# Patient Record
Sex: MALE | Race: WHITE
[De-identification: names, ages, dates, MRNs, and addresses within clinical notes are randomized per-mention and may not be internally consistent; named-entity substitution may affect disease eponyms.]

---

## 2018-01-08 NOTE — HP
CIWA Score





- CIWA Score


Nausea/Vomiting: 3


Muscle Tremors: 3


Anxiety: 3


Agitation: 3


Paroxysmal Sweats: 2


Orientation: 0-Oriented


Tacttile Disturbances: 2-Mild Itch/Numbness/Burn


Auditory Disturbances: 2-Mild Harshness/Frighten


Visual Disturbances: 0-None


Headache: 2-Mild


CIWA-Ar Total Score: 20





Admission ROS [facility]





- HPI


Chief Complaint: 





i need help  to stop using klonopin


Allergies/Adverse Reactions: 


 Allergies











Allergy/AdvReac Type Severity Reaction Status Date / Time


 


No Known Allergies Allergy   Verified 01/08/18 14:46











History of Present Illness: 





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

,last treatment 12/17 in [place]


history of htn,hypercholestolemia,hepatitis c treated


anxiety,depression


longest period of sobriety 9 months


Exam Limitations: No Limitations





- Ebola screening


Have you traveled outside of the country in the last 21 days: No (N)


Have you had contact with anyone from an Ebola affected area: No


Have you been sick,other than usual withdrawal symptoms: No


Do you have a fever: No





- Review of Systems


Constitutional: Loss of Appetite, Malaise, Night Sweats, Changes in sleep, 

Weakness


EENT: reports: No Symptoms Reported


Respiratory: reports: No Symptoms reported


Cardiac: reports: No Symptoms Reported


GI: reports: Nausea, Poor Appetite, Abdominal cramping


: reports: No Symptoms Reported


Musculoskeletal: reports: Back Pain, Muscle Pain


Integumentary: reports: Dryness


Neuro: reports: Headache, Tremors


Endocrine: reports: No Symptoms Reported


Hematology: reports: No Symptoms Reported


Psychiatric: reports: Anxious, Depressed





Patient History





- Patient Medical History


Hx Anemia: No


Hx Asthma: No


Hx Chronic Obstructive Pulmonary Disease (COPD): No


Hx Cancer: No


Hx Cardiac Disorders: No


Hx Congestive Heart Failure: No


Hx Hypertension: Yes (on med)


Hx Hypercholesterolemia: Yes (on med)


Hx Pacemaker: No


HX Cerebrovascular Accident: No


Hx Seizures: No


Hx Dementia: No


Hx Diabetes: No


Hx Gastrointestinal Disorders: No


Hx Liver Disease: Yes (hepatitis c)


Hx Genitourinary Disorders: No


Hx Sexually Transmitted Disorders: No


Hx Renal Disease (ESRD): No


Hx Thyroid Disease: No


Hx Human Immunodeficiency Virus (HIV): No (never been tested,wouild like to 

have test done)


Hx Hepatitis C: Yes (treated)


Hx Depression: Yes (anxiety)


Hx Suicide Attempt: No


Hx Bipolar Disorder: No


Hx Schizophrenia: No


Other Medical History: no suicidal,no homicidal





- Patient Surgical History


Hx Cardiac Surgery: Yes (ablation for tachycardia at [facility])


Hx Cholecystectomy: Yes (lap 11/17 at [facility])


Other Surgical History: back surgery last 2014,implanted device for nerve 

stimulation for back pain





- PPD History


Previous Implant?: Yes


Documented Results: Negative w/o proof


Implanted On Prior [facility] Admission?: No


PPD to be Administered?: Yes





- Smoking Cessation


Smoking history: Never smoked


Have you smoked in the past 12 months: No





- Substance & Tx. History


Hx Alcohol Use: No


Hx Substance Use: Yes


Substance Use Type: Tranquilizers


Hx Substance Use Treatment: Yes (12/17 in [place])





- Substances Abused


  ** Benzodiazepine (Klonopin)


Route: Oral


Frequency: Daily


Amount used: 1.5mg


Age of first use: 63


Date of Last Use: 01/08/18





Family Disease History





- Family Disease History


Family History: Denies





Admission Physical Exam [facility]





- Vital Signs


Vital Signs: 


 Vital Signs - 24 hr











  01/08/18





  14:18


 


Temperature 97.9 F


 


Pulse Rate 81


 


Respiratory 18





Rate 


 


Blood Pressure 104/63














- Physical


General Appearance: Yes: Moderate Distress, Tremorous, Irritable, Sweating, 

Anxious


HEENTM: Yes: Normal ENT Inspection, SHANTELL, Pharynx Normal


Respiratory: Yes: Lungs Clear, Normal Breath Sounds, No Respiratory Distress


Neck: Yes: Within Normal Limits, Supple, Trachea in good position


Breast: Yes: Within Normal Limits


Cardiology: Yes: Within Normal Limits (a/p ablation for tachycardia), Regular 

Rhythm, Regular Rate


Abdominal: Yes: Normal Bowel Sounds, Non Tender, Flat, Soft, Organomegaly


Genitourinary: Yes: Within Normal Limits


Back: Yes: Normal Inspection, Muscle Spasm


Musculoskeletal: Yes: full range of Motion, Back pain, Muscle Pain


Extremities: Yes: Within Normal Limits, Tremors


Neurological: Yes: CNs II-XII NML intact, Fully Oriented, Alert, Motor Strength 

5/5


Integumentary: Yes: Dry


Lymphatic: Yes: Within Normal Limits





- Diagnostic


(1) Uncomplicated sedative, hypnotic or anxiolytic withdrawal


Current Visit: Yes   Status: Acute   





(2) Hypertension


Current Visit: Yes   Status: Acute   





(3) Hypercholesterolemia


Current Visit: Yes   Status: Acute   





(4) Anxiety and depression


Current Visit: Yes   Status: Acute   





(5) History of laparoscopic cholecystectomy


Current Visit: Yes   Status: Acute   





(6) History of cardiac radiofrequency ablation


Current Visit: Yes   Status: Acute   





(7) History of back surgery


Current Visit: Yes   Status: Acute   





Cleared for Admission [facility]





- Detox or Rehab


[facility] Level of Care: Medically Managed


Detox Regimen/Protocol: Librium (patient will be on librium regiment reduced 

dose)





[facility] Breath Alcohol Content


Breath Alcohol Content: 0





Urine Drug Screen





- Results


Drug Screen Negative: No


Urine Drug Screen Results: BZO-Benzodiazepines

## 2018-01-09 NOTE — CONSULT
[facility] Psychiatric Consult





- Data


Date of interview: 01/09/18


Admission source: [facility]


Identifying data: Pt. is a 72 year old male, , father of one, and 

retired from the [employer]. This is patient's first admission to [facility]. 

Pt. admitted to  for benzodiazepine dependence.


Substance Abuse History: - Substances Abused.  ** Benzodiazepine (Klonopin).  

Route: Oral.  Frequency: Daily.  Amount used: 1.5mg.  Age of first use: 63.  

Date of Last Use: 01/08/18


Medical History: hypertension, Hep C, hypercholesterolemia, ablation for 

tachycardia, back surgery in 2014, implanted device for nerve stimulation for 

back pain.  Cholecystectomy


Psychiatric History: Pt. reports one psychiatric hospitalization in the 2000's 

at the [facility] in [facility] after reporting suicidal ideation. States he is 

currently seeing an outpatient psychiatrist by the name of Dr. Santos at the 

[facility]. Pt. was drafted and served in the [employer] from [id number]. Pt. is currently 

prescribed zoloft 200mg po daily and buspar 10mg TID. States he has been on 

many psychiatric medications throughout the years but was medication 

noncompliant. Pt. now reports medication adherence with his zoloft and buspar 

for the previous six months.  Pt. reports a diagnosis of PTSD, anxiety, and 

panic attacks. Pt. denies suicidal and homicidal ideation.


Physical/Sexual Abuse/Trauma History: Denies.





Mental Status Exam





- Mental Status Exam


Alert and Oriented to: Time, Place, Person


Cognitive Function: Good


Patient Appearance: Well Groomed


Mood: Hopeful


Affect: Mood Congruent


Patient Behavior: Appropriate, Cooperative


Speech Pattern: Appropriate


Voice Loudness: Normal


Thought Process: Goal Oriented


Thought Disorder: Not Present


Hallucinations: Denies


Suicidal Ideation: Denies


Homicidal Ideation: Denies


Insight/Judgement: Poor


Sleep: Fair


Appetite: Fair


Muscle strength/Tone: Normal


Gait/Station: Normal





Psychiatric Findings





- Problem List (Axis 1, 2,3)


(1) Uncomplicated sedative, hypnotic or anxiolytic withdrawal


Current Visit: Yes   Status: Acute   





(2) PTSD (post-traumatic stress disorder)


Current Visit: Yes   Status: Chronic   Comment: Self reports.   





(3) Generalized anxiety disorder


Current Visit: Yes   Status: Chronic   Comment: Self reports.   





(4) Panic attacks


Current Visit: Yes   Status: Chronic   Comment: Self reports.   





- Initial Treatment Plan


Initial Treatment Plan: Psychoeducation provided. Detoxification in progress. 

Zoloft 150mg po daily (reduce dosage) +buspar 15mg BID ordered. Verbal consent 

given. Pt. agreeable with plan. Benefits and side effects discussed. Will 

continue to monitor patient.

## 2018-01-09 NOTE — EKG
Test Reason : 

Blood Pressure : ***/*** mmHG

Vent. Rate : 073 BPM     Atrial Rate : 073 BPM

   P-R Int : 162 ms          QRS Dur : 068 ms

    QT Int : 402 ms       P-R-T Axes : 034 -55 042 degrees

   QTc Int : 442 ms

 

SINUS RHYTHM WITH MARKED SINUS ARRHYTHMIA WITH OCCASIONAL PREMATURE

VENTRICULAR COMPLEXES

LEFT AXIS DEVIATION

INFERIOR INFARCT (CITED ON OR BEFORE 08-JAN-2018)

ANTEROLATERAL INFARCT , AGE UNDETERMINED

ABNORMAL ECG

Confirmed by Dileep Lema MD ([id number]) on 1/9/2018 2:44:30 PM

 

Referred By:             Confirmed By:Dileep Lema MD

## 2018-01-09 NOTE — EKG
Test Reason : 

Blood Pressure : ***/*** mmHG

Vent. Rate : 068 BPM     Atrial Rate : 068 BPM

   P-R Int : 144 ms          QRS Dur : 074 ms

    QT Int : 416 ms       P-R-T Axes : 030 -52 033 degrees

   QTc Int : 442 ms

 

SINUS RHYTHM WITH MARKED SINUS ARRHYTHMIA

LEFT AXIS DEVIATION

INFERIOR INFARCT , AGE UNDETERMINED

ABNORMAL ECG

NO PREVIOUS ECGS AVAILABLE

Confirmed by Dileep Lema MD ([id number]) on 1/9/2018 3:19:03 PM

 

Referred By:             Confirmed By:Dileep Lema MD

## 2018-01-09 NOTE — PN
S CIWA





- CIWA Score


Nausea/Vomiting: 3


Muscle Tremors: 3


Anxiety: 3


Agitation: 2


Paroxysmal Sweats: 1-Minimal Palms Moist


Orientation: 0-Oriented


Tacttile Disturbances: 1-Very Mild Itch/Numbness


Auditory Disturbances: 1-Very Mild


Visual Disturbances: 0-None


Headache: 2-Mild


CIWA-Ar Total Score: 16





[facility] Progress Note (SOAP)


Subjective: 





ALERT,IRRITABLE,ANXIOUS,INTERRUPTED SLEEP,TREMOR


Objective: 





01/09/18 10:22


 Vital Signs











Temperature  98.4 F   01/09/18 10:00


 


Pulse Rate  83   01/09/18 10:00


 


Respiratory Rate  20   01/09/18 10:00


 


Blood Pressure  126/76   01/09/18 10:00


 


O2 Sat by Pulse Oximetry (%)      








NSR WITH SINUS ARRHYTHMIA,OCCASIONAL VPC


NO CHEST PAIN,NO SOB,NO DIZZINESS


01/09/18 10:23


 Laboratory Last Values











Sodium  143 mmol/L (136-145)   01/09/18  08:00    


 


Potassium  3.8 mmol/L (3.5-5.1)   01/09/18  08:00    


 


Chloride  106 mmol/L ()   01/09/18  08:00    


 


Urine Color  Yellow   01/08/18  06:30    


 


Urine Appearance  Clear   01/08/18  06:30    


 


Urine pH  6.0  (5.0-8.0)   01/08/18  06:30    


 


Ur Specific Gravity  1.009  (1.001-1.035)   01/08/18  06:30    


 


Urine Protein  Negative  (NEGATIVE)   01/08/18  06:30    


 


Urine Glucose (UA)  Negative  (NEGATIVE)   01/08/18  06:30    


 


Urine Ketones  Negative  (NEGATIVE)   01/08/18  06:30    


 


Urine Blood  Negative  (NEGATIVE)   01/08/18  06:30    


 


Urine Nitrite  Negative  (NEGATIVE)   01/08/18  06:30    


 


Urine Bilirubin  Negative  (NEGATIVE)   01/08/18  06:30    


 


Urine Urobilinogen  Negative mg/dL (0.2-1.0)   01/08/18  06:30    


 


Ur Leukocyte Esterase  Negative  (NEGATIVE)   01/08/18  06:30    








LABS PENDING


Assessment: 





01/09/18 10:24


WITHDRAWAL SYMPTOM


Plan: 





CONTINUE DETOX

## 2018-01-10 NOTE — PN
S CIWA





- CIWA Score


Nausea/Vomiting: 3


Muscle Tremors: 3


Anxiety: 3


Agitation: 3


Paroxysmal Sweats: 1-Minimal Palms Moist


Orientation: 0-Oriented


Tacttile Disturbances: 1-Very Mild Itch/Numbness


Auditory Disturbances: 1-Very Mild


Visual Disturbances: 0-None


Headache: 2-Mild


CIWA-Ar Total Score: 17





[facility] Progress Note (SOAP)


Subjective: 





ALERT,IRRITABLE,ANXIOUS,INTERRUPTED SLEEP,TREMOR


Objective: 





01/10/18 11:00


 Vital Signs











Temperature  98.3 F   01/10/18 07:28


 


Pulse Rate  74   01/10/18 07:28


 


Respiratory Rate  18   01/10/18 07:28


 


Blood Pressure  145/82   01/10/18 07:28


 


O2 Sat by Pulse Oximetry (%)      











Assessment: 





01/10/18 11:00


 Laboratory Last Values











WBC  7.9 K/mm3 (4.0-10.0)   01/09/18  08:00    


 


RBC  4.19 M/mm3 (4.00-5.60)   01/09/18  08:00    


 


Hgb  11.4 GM/dL (11.7-16.9)  L  01/09/18  08:00    


 


Hct  34.4 % (35.4-49)  L  01/09/18  08:00    


 


MCV  82.2 fl (80-96)   01/09/18  08:00    


 


MCH  27.2 pg (25.7-33.7)   01/09/18  08:00    


 


MCHC  33.1 g/dl (32.0-35.9)   01/09/18  08:00    


 


RDW  14.4 % (11.9-15.9)   01/09/18  08:00    


 


Plt Count  153 K/MM3 (134-434)   01/09/18  08:00    


 


MPV  8.9 fl (7.5-11.1)   01/09/18  08:00    


 


Sodium  143 mmol/L (136-145)   01/09/18  08:00    


 


Potassium  3.8 mmol/L (3.5-5.1)   01/09/18  08:00    


 


Chloride  106 mmol/L ()   01/09/18  08:00    


 


Carbon Dioxide  30 mmol/L (21-32)   01/09/18  08:00    


 


Anion Gap  7  (8-16)  L  01/09/18  08:00    


 


BUN  20 mg/dL (7-18)  H  01/09/18  08:00    


 


Creatinine  0.9 mg/dL (0.7-1.3)   01/09/18  08:00    


 


Creat Clearance w eGFR  > 60  (>60)   01/09/18  08:00    


 


POC Glucometer  114 UNITS ()   01/10/18  09:48    


 


Random Glucose  100 mg/dL ()   01/09/18  08:00    


 


Calcium  8.9 mg/dL (8.5-10.1)   01/09/18  08:00    


 


Total Bilirubin  0.4 mg/dL (0.2-1.0)   01/09/18  08:00    


 


AST  17 U/L (15-37)   01/09/18  08:00    


 


ALT  26 U/L (12-78)   01/09/18  08:00    


 


Alkaline Phosphatase  94 U/L ()   01/09/18  08:00    


 


Total Protein  7.0 g/dl (6.4-8.2)   01/09/18  08:00    


 


Albumin  3.7 g/dl (3.4-5.0)   01/09/18  08:00    


 


Urine Color  Yellow   01/08/18  06:30    


 


Urine Appearance  Clear   01/08/18  06:30    


 


Urine pH  6.0  (5.0-8.0)   01/08/18  06:30    


 


Ur Specific Gravity  1.009  (1.001-1.035)   01/08/18  06:30    


 


Urine Protein  Negative  (NEGATIVE)   01/08/18  06:30    


 


Urine Glucose (UA)  Negative  (NEGATIVE)   01/08/18  06:30    


 


Urine Ketones  Negative  (NEGATIVE)   01/08/18  06:30    


 


Urine Blood  Negative  (NEGATIVE)   01/08/18  06:30    


 


Urine Nitrite  Negative  (NEGATIVE)   01/08/18  06:30    


 


Urine Bilirubin  Negative  (NEGATIVE)   01/08/18  06:30    


 


Urine Urobilinogen  Negative mg/dL (0.2-1.0)   01/08/18  06:30    


 


Ur Leukocyte Esterase  Negative  (NEGATIVE)   01/08/18  06:30    


 


RPR Titer  Nonreactive  (NONREACTIVE)   01/09/18  08:00    


 


HIV 1&2 Antibody Screen  Negative   01/09/18  08:00    


 


HIV P24 Antigen  Negative   01/09/18  08:00    











01/10/18 11:01


WITHDRAWAL SYMPTOM


Plan: 





CONTINUE DETOX,BGM MONITORING

## 2018-01-11 NOTE — PN
[facility] Progress Note (SOAP)


Subjective: 





ALERT,IRRITABLE,ANXIOUS,INTERRUPTED SLEEP


Objective: 





01/11/18 10:54


 Vital Signs











Temperature  98.2 F   01/11/18 10:08


 


Pulse Rate  67   01/11/18 10:08


 


Respiratory Rate  18   01/11/18 10:08


 


Blood Pressure  159/90   01/11/18 10:08


 


O2 Sat by Pulse Oximetry (%)      











Assessment: 





01/11/18 10:54


WITHDRAWAL SYMPTOM


Plan: 





CONTINUE DETOX,BGM IS 98,DISCHARGE IN AM

## 2018-01-12 NOTE — DS
[facility] Detox Discharge Summary


Admission Date: 


01/08/18





Discharge Date: 01/12/18





- History


Present History: Sedative Dependence


Additional Comments: 





follow up with after care program as arrangement


Pertinent Past History: 





hypertension


hypercholesteolemia


history of lap cholecystectomy


history of cardiac ablation


history of back surgery





- Physical Exam Results


Vital Signs: 


 Vital Signs











Temperature  97.3 F L  01/12/18 06:21


 


Pulse Rate  77   01/12/18 06:21


 


Respiratory Rate  18   01/12/18 06:21


 


Blood Pressure  146/87   01/12/18 06:21


 


O2 Sat by Pulse Oximetry (%)      











Pertinent Admission Physical Exam Findings: 





withdrawal symptom and finding





- Treatment


Hospital Course: Detox Protocol Followed, Detoxed Safely, Responded well, 

Discharged Condition Good


Patient has Accepted a Rehab Referral to: declined





- Medication


Discharge Medications: 


Ambulatory Orders





Ascorbic Acid [Vitamin C -] 500 mg PO BID 01/08/18 


Buspirone HCl [Buspar -] 10 mg PO BID 01/08/18 


Cholecalciferol (Vitamin D3) [Vitamin D3 -] 1,000 unit PO DAILY 01/08/18 


Doxazosin Mesylate 8 mg PO DAILY 01/08/18 


Hydrochlorothiazide [Hctz -] 25 mg PO DAILY 01/08/18 


Omeprazole 20 mg PO DAILY 01/08/18 


Sertraline HCl [Zoloft -] 200 mg PO DAILY 01/08/18 


Simvastatin [Zocor -] 40 mg PO HS 01/08/18 











- Diagnosis


(1) Uncomplicated sedative, hypnotic or anxiolytic withdrawal


Current Visit: Yes   Status: Acute   





(2) Hypertension


Current Visit: Yes   Status: Acute   





(3) Hypercholesterolemia


Current Visit: Yes   Status: Acute   





(4) Anxiety and depression


Current Visit: Yes   Status: Acute   





(5) History of laparoscopic cholecystectomy


Current Visit: Yes   Status: Acute   





(6) History of cardiac radiofrequency ablation


Current Visit: Yes   Status: Acute   





(7) History of back surgery


Current Visit: Yes   Status: Acute   





- AMA


Did Patient Leave Against Medical Advice: No

## 2020-03-17 ENCOUNTER — HOSPITAL ENCOUNTER (INPATIENT)
Dept: HOSPITAL 74 - J2C | Age: 75
LOS: 107 days | Discharge: HOME | DRG: 454 | End: 2020-07-02
Payer: COMMERCIAL

## 2020-03-17 VITALS — BODY MASS INDEX: 29 KG/M2

## 2020-03-17 DIAGNOSIS — R06.89: ICD-10-CM

## 2020-03-17 DIAGNOSIS — I47.1: ICD-10-CM

## 2020-03-17 DIAGNOSIS — M41.86: Primary | ICD-10-CM

## 2020-03-17 DIAGNOSIS — E78.5: ICD-10-CM

## 2020-03-17 DIAGNOSIS — E78.00: ICD-10-CM

## 2020-03-17 DIAGNOSIS — E87.6: ICD-10-CM

## 2020-03-17 DIAGNOSIS — R50.9: ICD-10-CM

## 2020-03-17 DIAGNOSIS — I10: ICD-10-CM

## 2020-03-17 DIAGNOSIS — F43.10: ICD-10-CM

## 2020-03-17 DIAGNOSIS — D72.829: ICD-10-CM

## 2020-03-17 DIAGNOSIS — B19.20: ICD-10-CM

## 2020-03-17 DIAGNOSIS — M47.896: ICD-10-CM

## 2020-03-17 DIAGNOSIS — E66.9: ICD-10-CM

## 2020-03-17 DIAGNOSIS — R00.8: ICD-10-CM

## 2020-03-17 DIAGNOSIS — D64.9: ICD-10-CM

## 2020-03-17 DIAGNOSIS — F41.8: ICD-10-CM

## 2020-03-17 PROCEDURE — P9038 RBC IRRADIATED: HCPCS

## 2020-03-17 PROCEDURE — P9058 RBC, L/R, CMV-NEG, IRRAD: HCPCS

## 2020-03-17 PROCEDURE — U0003 INFECTIOUS AGENT DETECTION BY NUCLEIC ACID (DNA OR RNA); SEVERE ACUTE RESPIRATORY SYNDROME CORONAVIRUS 2 (SARS-COV-2) (CORONAVIRUS DISEASE [COVID-19]), AMPLIFIED PROBE TECHNIQUE, MAKING USE OF HIGH THROUGHPUT TECHNOLOGIES AS DESCRIBED BY CMS-2020-01-R: HCPCS

## 2020-06-25 LAB
ANISOCYTOSIS BLD QL: 0
BASOPHILS # BLD: 0.1 % (ref 0–2)
DEPRECATED RDW RBC AUTO: 14.3 % (ref 11.9–15.9)
EOSINOPHIL # BLD: 0 % (ref 0–4.5)
HCT VFR BLD CALC: 32.3 % (ref 35.4–49)
HGB BLD-MCNC: 10.9 GM/DL (ref 11.7–16.9)
LYMPHOCYTES # BLD: 3.6 % (ref 8–40)
MACROCYTES BLD QL: 0
MCH RBC QN AUTO: 29.7 PG (ref 25.7–33.7)
MCHC RBC AUTO-ENTMCNC: 33.6 G/DL (ref 32–35.9)
MCV RBC: 88.2 FL (ref 80–96)
MONOCYTES # BLD AUTO: 4 % (ref 3.8–10.2)
NEUTROPHILS # BLD: 92.3 % (ref 42.8–82.8)
OVALOCYTES BLD QL SMEAR: (no result)
PLATELET # BLD AUTO: 173 K/MM3 (ref 134–434)
PLATELET BLD QL SMEAR: NORMAL
PMV BLD: 9.2 FL (ref 7.5–11.1)
RBC # BLD AUTO: 3.66 M/MM3 (ref 4–5.6)
WBC # BLD AUTO: 15.5 K/MM3 (ref 4–10)

## 2020-06-25 PROCEDURE — 0SG30AJ FUSION OF LUMBOSACRAL JOINT WITH INTERBODY FUSION DEVICE, POSTERIOR APPROACH, ANTERIOR COLUMN, OPEN APPROACH: ICD-10-PCS | Performed by: NEUROLOGICAL SURGERY

## 2020-06-25 PROCEDURE — 0SG3071 FUSION OF LUMBOSACRAL JOINT WITH AUTOLOGOUS TISSUE SUBSTITUTE, POSTERIOR APPROACH, POSTERIOR COLUMN, OPEN APPROACH: ICD-10-PCS | Performed by: NEUROLOGICAL SURGERY

## 2020-06-25 PROCEDURE — 0RGA071 FUSION OF THORACOLUMBAR VERTEBRAL JOINT WITH AUTOLOGOUS TISSUE SUBSTITUTE, POSTERIOR APPROACH, POSTERIOR COLUMN, OPEN APPROACH: ICD-10-PCS | Performed by: NEUROLOGICAL SURGERY

## 2020-06-25 PROCEDURE — 0JX70ZB TRANSFER BACK SUBCUTANEOUS TISSUE AND FASCIA WITH SKIN AND SUBCUTANEOUS TISSUE, OPEN APPROACH: ICD-10-PCS | Performed by: NEUROLOGICAL SURGERY

## 2020-06-25 PROCEDURE — 0ST40ZZ RESECTION OF LUMBOSACRAL DISC, OPEN APPROACH: ICD-10-PCS | Performed by: NEUROLOGICAL SURGERY

## 2020-06-25 PROCEDURE — 0JPT0MZ REMOVAL OF STIMULATOR GENERATOR FROM TRUNK SUBCUTANEOUS TISSUE AND FASCIA, OPEN APPROACH: ICD-10-PCS | Performed by: NEUROLOGICAL SURGERY

## 2020-06-25 PROCEDURE — B01BZZZ FLUOROSCOPY OF SPINAL CORD: ICD-10-PCS | Performed by: NEUROLOGICAL SURGERY

## 2020-06-25 PROCEDURE — 00PU0MZ REMOVAL OF NEUROSTIMULATOR LEAD FROM SPINAL CANAL, OPEN APPROACH: ICD-10-PCS | Performed by: NEUROLOGICAL SURGERY

## 2020-06-25 PROCEDURE — 0SG1071 FUSION OF 2 OR MORE LUMBAR VERTEBRAL JOINTS WITH AUTOLOGOUS TISSUE SUBSTITUTE, POSTERIOR APPROACH, POSTERIOR COLUMN, OPEN APPROACH: ICD-10-PCS | Performed by: NEUROLOGICAL SURGERY

## 2020-06-25 PROCEDURE — 30233N1 TRANSFUSION OF NONAUTOLOGOUS RED BLOOD CELLS INTO PERIPHERAL VEIN, PERCUTANEOUS APPROACH: ICD-10-PCS | Performed by: INTERNAL MEDICINE

## 2020-06-25 RX ADMIN — ACETAMINOPHEN SCH: 325 TABLET ORAL at 23:04

## 2020-06-25 RX ADMIN — CEFAZOLIN SCH MLS: 1 INJECTION, POWDER, FOR SOLUTION INTRAVENOUS at 21:30

## 2020-06-25 RX ADMIN — MUPIROCIN SCH APPLIC: 20 OINTMENT TOPICAL at 23:04

## 2020-06-25 RX ADMIN — SODIUM CHLORIDE, POTASSIUM CHLORIDE, SODIUM LACTATE AND CALCIUM CHLORIDE SCH MLS: 600; 310; 30; 20 INJECTION, SOLUTION INTRAVENOUS at 22:09

## 2020-06-25 NOTE — OP
Operative Note





- Note:


Operative Date: 06/25/20


Pre-Operative Diagnosis: lumbar spondylosis


Operation: T12-S2 Laminectomies with L34, L45 and L5S1 transpedicular 

decompression and osteotomies and deformity correction T12-S2 pedicle screw 

fusion with athrodesis and interbody cage at L5-S1


Post-Operative Diagnosis: Same as Pre-op


Surgeon: Thad Gray


Assistant: Lorena Lopez


Anesthesiologist/CRNA: Shayne Haider


Anesthesia: General, Spinal, Local


Estimated Blood Loss (mls): 1,000


Drains & Tubes with Location: right Lumbar spine KIAH drain placed


Drains, Volume Out (mls): 300 (padron)


Blood Volume Replaced (mls): 250 (1 Unit)


Fluid Volume Replaced (mls): 3,000


Operative Report Dictated: Yes

## 2020-06-25 NOTE — CONSULT
Consultation: 


REQUESTING PROVIDER:


Orthopedic surgery


CONSULT REQUEST: We have been asked to medically evaluate this patient for 

(lumbar spondylosis s/p T12-S2 Laminectomies, L34, L45 and L5S1 transpedicular 

decompression, osteotomies, deformity correction T12-S2 pedicle screw fusion 

with athrodesis/interbody cage at L5-S1).





HISTORY OF PRESENT ILLNESS:





74 M, pmh of htn, hld, hx pacs and pvcs, svt s/p ablation 2017, presents to [facility]

for elective spine surgery w/ Thad Erickson. Unable to verify further hx 

at this time as pt is sedated and vented from surgery. Upon chart review, pt was

cleared by cardiology, w/ no cp, sob, palps, dizzy, loc, pnd, orthopnea le 

edema. Pt regularly f/u w/ cardio, last visit about 6 mos ago, reported normal 

findings. As per pmd notes, pt has known hx of pacs/pvcs with nl lvef. Pt 

received versed while starting anesthesia, noticed pacs in [facility] on monitor 

thus surgery postponed. Pt is now s/p surgery and remains vented and sedated in 

ICU. 





REVIEW OF SYSTEMS:


unable to obtain as pt is vented and sedated.





PHYSICAL EXAMINATION





                               Vital Signs - 24 hr











  06/25/20 06/25/20 06/25/20





  06:43 08:46 09:00


 


Temperature  97.7 F 


 


Pulse Rate  70 72


 


Respiratory  18 18





Rate   


 


Blood Pressure  129/61 129/66


 


O2 Sat by Pulse 100 99 97





Oximetry (%)   



































GENERAL: sedated and vented


EYES: Pupils equal, round and reactive to light, extraocular movements intact, 

sclera anicteric, waking up 


EARS, NOSE, THROAT: Moist mucous membranes.


NECK: Normal range of motion, supple without lymphadenopathy, JVD, or masses.


LUNGS: breath sounds decreased on the left side compared to right. 


HEART: Regular rate and irregular rhythm, normal S1 and S2 without murmur, rub 

or gallop.


ABDOMEN: Soft, nontender, not distended, normoactive bowel sounds, no guarding, 

no rebound, no masses.  


UPPER EXTREMITIES: 2+ pulses, warm, well-perfused. No cyanosis. No peripheral 

edema.


LOWER EXTREMITIES: 2+ pulses, warm, well-perfused. No peripheral edema. 


NEUROLOGICAL: sedated, vented, waking up slowly 


SKIN: Warm, dry, normal turgor, no rashes or lesions noted. 











                         Laboratory Results - last 24 hr











  06/25/20 06/25/20





  06:10 20:30


 


WBC   15.5 H


 


RBC   3.66 L


 


Hgb   10.9 L


 


Hct   32.3 L


 


MCV   88.2


 


MCH   29.7


 


MCHC   33.6


 


RDW   14.3


 


Plt Count   173


 


MPV   9.2


 


Absolute Neuts (auto)   14.3 H


 


Neutrophils %   92.3 H D


 


Neutrophils % (Manual)   85.1 H


 


Band Neutrophils %   7.9


 


Lymphocytes %   3.6 L D


 


Lymphocytes % (Manual)   2.0 L


 


Monocytes %   4.0


 


Monocytes % (Manual)   4


 


Eosinophils %   0.0  D


 


Eosinophils % (Manual)   0.0


 


Basophils %   0.1


 


Basophils % (Manual)   1.0


 


Myelocytes % (Man)   0


 


Promyelocytes % (Man)   0


 


Blast Cells % (Manual)   0


 


Nucleated RBC %   0


 


Metamyelocytes   0


 


Hypochromia   1+


 


Platelet Estimate   Normal


 


Polychromasia   1+


 


Poikilocytosis   1+


 


Anisocytosis   0


 


Microcytosis   0


 


Macrocytosis   0


 


Ovalocytes   1+


 


Blood Type  B POSITIVE 


 


Antibody Screen  Negative 


 


Crossmatch  See Detail 








Active Medications











Generic Name Dose Route Start Last Admin





  Trade Name Freq  PRN Reason Stop Dose Admin


 


Acetaminophen  650 mg  06/25/20 16:15 





  Tylenol -  PO  





  Q6H [facility]  


 


Ascorbic Acid  500 mg  06/25/20 22:00 





  Vitamin C -  PO  





  BID [facility]  


 


Atorvastatin Calcium  40 mg  06/25/20 22:00 





  Lipitor -  PO  





  HS [facility]  


 


Chlorhexidine Gluconate  1 applic  06/25/20 22:00 





  Hibiclens For Decolonization -  TP  





  HS [facility]  


 


Clonazepam  1 mg  06/25/20 22:00 





  Klonopin -  PO  





  BID ANTELMO  


 


Dicyclomine HCl  10 mg  06/25/20 22:00 





  Bentyl -  PO  





  BID [facility]  


 


Diphenhydramine HCl  25 mg  06/25/20 16:15 





  Benadryl Injection -  IVPUSH  





  ONCE PRN  





  FOR ITCHING  


 


Diphenhydramine HCl  25 mg  06/25/20 16:24 





  Benadryl -  PO  





  Q6H PRN  





  FOR ITCHING  


 


Docusate Sodium  100 mg  06/25/20 22:00 





  Colace -  PO  





  TID ANTELMO  


 


Fentanyl  25 mcg  06/25/20 18:25  06/25/20 19:10





  Sublimaze Injection -  IVPUSH   25 mcg





  [id number] PRN   Administration





  PAIN-PACU ORDER X 4 DOSES ONLY  


 


Ferrous Sulfate  325 mg  06/26/20 08:00 





  Feosol -  PO  





  DAILY@0800 [facility]  


 


Folic Acid  1 mg  06/26/20 10:00 





  Folic Acid -  PO  





  DAILY [facility]  


 


Gabapentin  200 mg  06/25/20 22:00 





  Neurontin -  PO  





  BID [facility]  


 


Heparin Sodium (Porcine)  5,000 unit  06/26/20 10:00 





  Heparin -  SQ  





  Q8H [facility]  


 


Hydrochlorothiazide  25 mg  06/26/20 10:00 





  Hctz -  PO  





  DAILY [facility]  


 


Propofol  1,000,000 mcg in 100 mls @ 2.449 mls/hr  06/25/20 16:30  06/25/20 

16:30





  Diprivan -  IVPB   0 mls





  TITR ANTELMO   Administration





  Protocol  





  5 MCG/KG/MIN  


 


Lactated Ringer's  1,000 ml in 1,000 mls @ 75 mls/hr  06/25/20 16:30 





  Lactated Ringers Solution  IV  





  ASDIR [facility]  


 


Cefazolin Sodium 1 gm/  50 mls @ 100 mls/hr  06/25/20 22:00 





  Dextrose  IVPB  





  TID [facility]  


 


Multivitamins/Minerals/Vitamin C  1 tab  06/26/20 10:00 





  Tab-A-Vit -  PO  





  DAILY [facility]  


 


Mupirocin  1 applic  06/25/20 22:00 





  Bactroban Ointment (For Decolonization) -  NS  06/30/20 21:59 





  BID [facility]  


 


Naloxone HCl  0.4 mg  06/25/20 16:15 





  Narcan -  IVPUSH  





  ONCE PRN  





  Sedation  


 


Ondansetron HCl  4 mg  06/25/20 16:24 





  Zofran Injection  IVPUSH  





  Q6H PRN  





  NAUSEA  


 


Oxycodone HCl  10 mg  06/26/20 16:17 





  Oxycontin -  PO  





  BID [facility]  


 


Pantoprazole Sodium  20 mg  06/26/20 10:00 





  Protonix -  PO  





  DAILY [facility]  


 


Tamsulosin HCl  0.4 mg  06/26/20 08:30 





  Flomax -  PO  





  0830 [facility]  


 


Venlafaxine HCl  75 mg  06/25/20 22:00 





  Effexor Xr -  PO  





  BID [facility]  











ASSESSMENT/PLAN:





74 M, pmh of htn, hld, hx pacs and pvcs, svt s/p ablation 2017, presents to [facility]

for elective spine surgery w/ Thad Erickson for lumbar spondylosis s/p 

T12-S2 Laminectomies, decompression, osteotomies, deformity correction, screw 

fusion is admitted for ICU monitoring post op





#Neuro


remains vented and sedated


Propofol 30


Will start fentalyn, reassess in the morning to wake pt up 


oxycodone for pain management as per surgery


gabapentin


Narcan given once 


Tylenol Q6H





#Pulmonary


Earlier CXR showed ETT in the right mainstem bronchus, decreased breath sounds 

on left


R/p CXR shows ETT in remains in the right bronchus, will pull back 3-4 cm


AC 12/500/40/5, will wean tidal volume down 


R/p CXR to confirm placement





#CV


cont IVF LR at 75


EKG shows pacs in bigemeny, as per PMD, pt has normal pacs and LV function is 

wnl 


HCTZ 25


Lipitor 40





#GI


protonix 


zofran 


docusate





#ID


leukocytosis likely reactive to surgery 


Cefazolin 


can d/c after 24hrs if warranted 


#Derm


Benadryl for itching 





#


flomax





#Psych


unable to verify dx


effexor 


Klonopin 





#Heme


Normocytic anemia 


cont ferrous sulfate


cont folic acid 





#DVT


hep sq





#GI ppx


Protonix





FEN


cont LR at 75


monitor lytes


Clear liquid diet, NGT for meds for now 





Dispo: We will continue to follow the patient. Thank you for this consultative 

opportunity. Rest as per surgery 











Visit type





- Emergency Visit


Emergency Visit: Yes


ED Registration Date: 06/25/20


Care time: The patient presented to the Emergency Department on the above date 

and was hospitalized for further evaluation of their emergent condition.





- New Patient


This patient is new to me today: Yes


Date on this admission: 06/29/20





- Critical Care


Critical Care patient: No





ATTENDING PHYSICIAN STATEMENT





I saw and evaluated the patient.


I reviewed the resident's note and discussed the case with the resident.


I agree with the resident's findings and plan as documented.








SUBJECTIVE:








OBJECTIVE:








ASSESSMENT AND PLAN:

## 2020-06-25 NOTE — CON.CARD
Cardiology Consult (text)





- Consultation


Consultation Note: 





cc: elective spine surgery





hpi:  74 m hx htn, hld, hx pacs and pvcs, svt s/p ablation 2017, here for 

elective spine surgery.  No cp sob palps dizzy loc pnd orthopnea le edema.  Sees

cardio regularly, last visit about 6 mos ago, reported normal findings.  Today 

got versed while starting anesthesia and noticed pacs in bigeminy on monitor so 

surgery postponed.  Feeling well now.  PACs on ecg and tele.





pmh: per hpi


psh: ablation


social: ex tob


fam: no premature cad scd


ros: per hpi; back pain; all others wnl


meds:


                                   Vital Signs











 Period  Temp  Pulse  Resp  BP Sys/Beard  Pulse Ox


 


 Last 24 Hr  97.7 F  67-72  18-18  129-155/61-81  








nad no jvd


rrr s1s2 no mrg


cta bl nl eff


aao3


no le e/c/c


abd nt nd pos bs


no jaundice diaphoresis


pos dp pt no carotid bruits





                             Laboratory Last Values











Blood Type  B POSITIVE   06/25/20  06:10    


 


Antibody Screen  Negative   06/25/20  06:10    


 


Crossmatch  See Detail   06/25/20  06:10    








ecg: sr pacs bigeminy, nl intervals, no ischemic changes





tele: sr, occ pacs








a/p:  74 m hx htn, hld, hx pacs and pvcs, svt s/p ablation 2017, here for 

elective spine surgery.





abnl ecg:


-ecg/tele showing occasional pacs, at times in bigeminy pattern. pt is 

asymptomatic from these.  as per pmd notes pt has known hx of pacs/pvcs with nl 

lvef.  This is benign and no further cardiac tx/testing needed at this time.  No

cardiac contraindications to proceeding with planned spine surgery.





htn:


-cont home meds





hld:


-cont home med





svt s/p ablation:


-in sr here


-cont outpt cardio f/u

## 2020-06-25 NOTE — HP
History & Physical Update





- History


History: No Change





- Physical


Physical: No Change





- Assessment


Assessment: No Change





- Plan


Plan: No Change (no changes since visit on 6/23/20)

## 2020-06-26 LAB
ANION GAP SERPL CALC-SCNC: 4 MMOL/L (ref 8–16)
ANION GAP SERPL CALC-SCNC: 6 MMOL/L (ref 8–16)
BUN SERPL-MCNC: 20.4 MG/DL (ref 7–18)
BUN SERPL-MCNC: 21.8 MG/DL (ref 7–18)
CALCIUM SERPL-MCNC: 7.2 MG/DL (ref 8.5–10.1)
CALCIUM SERPL-MCNC: 7.5 MG/DL (ref 8.5–10.1)
CHLORIDE SERPL-SCNC: 109 MMOL/L (ref 98–107)
CHLORIDE SERPL-SCNC: 110 MMOL/L (ref 98–107)
CO2 SERPL-SCNC: 26 MMOL/L (ref 21–32)
CO2 SERPL-SCNC: 30 MMOL/L (ref 21–32)
CREAT SERPL-MCNC: 0.9 MG/DL (ref 0.55–1.3)
CREAT SERPL-MCNC: 0.9 MG/DL (ref 0.55–1.3)
DEPRECATED RDW RBC AUTO: 14.4 % (ref 11.9–15.9)
GLUCOSE SERPL-MCNC: 124 MG/DL (ref 74–106)
GLUCOSE SERPL-MCNC: 128 MG/DL (ref 74–106)
HCT VFR BLD CALC: 30.4 % (ref 35.4–49)
HGB BLD-MCNC: 10.1 GM/DL (ref 11.7–16.9)
MCH RBC QN AUTO: 29.5 PG (ref 25.7–33.7)
MCHC RBC AUTO-ENTMCNC: 33.3 G/DL (ref 32–35.9)
MCV RBC: 88.6 FL (ref 80–96)
PLATELET # BLD AUTO: 156 K/MM3 (ref 134–434)
PMV BLD: 9 FL (ref 7.5–11.1)
POTASSIUM SERPLBLD-SCNC: 4 MMOL/L (ref 3.5–5.1)
POTASSIUM SERPLBLD-SCNC: 4 MMOL/L (ref 3.5–5.1)
RBC # BLD AUTO: 3.43 M/MM3 (ref 4–5.6)
SODIUM SERPL-SCNC: 143 MMOL/L (ref 136–145)
SODIUM SERPL-SCNC: 143 MMOL/L (ref 136–145)
WBC # BLD AUTO: 13 K/MM3 (ref 4–10)

## 2020-06-26 RX ADMIN — FOLIC ACID SCH: 1 TABLET ORAL at 12:32

## 2020-06-26 RX ADMIN — OXYCODONE HYDROCHLORIDE AND ACETAMINOPHEN SCH: 500 TABLET ORAL at 12:31

## 2020-06-26 RX ADMIN — CHLORHEXIDINE GLUCONATE SCH APPLIC: 213 SOLUTION TOPICAL at 22:02

## 2020-06-26 RX ADMIN — ACETAMINOPHEN SCH: 325 TABLET ORAL at 23:00

## 2020-06-26 RX ADMIN — ACETAMINOPHEN SCH: 325 TABLET ORAL at 05:52

## 2020-06-26 RX ADMIN — HEPARIN SODIUM SCH UNIT: 5000 INJECTION, SOLUTION INTRAVENOUS; SUBCUTANEOUS at 12:00

## 2020-06-26 RX ADMIN — ATORVASTATIN CALCIUM SCH: 40 TABLET, FILM COATED ORAL at 03:28

## 2020-06-26 RX ADMIN — GABAPENTIN SCH: 100 CAPSULE ORAL at 03:28

## 2020-06-26 RX ADMIN — HYDROCHLOROTHIAZIDE SCH: 25 TABLET ORAL at 12:32

## 2020-06-26 RX ADMIN — ACETAMINOPHEN SCH: 325 TABLET ORAL at 05:03

## 2020-06-26 RX ADMIN — HEPARIN SODIUM SCH UNIT: 5000 INJECTION, SOLUTION INTRAVENOUS; SUBCUTANEOUS at 11:50

## 2020-06-26 RX ADMIN — DOCUSATE SODIUM SCH: 100 CAPSULE, LIQUID FILLED ORAL at 05:04

## 2020-06-26 RX ADMIN — VENLAFAXINE HYDROCHLORIDE SCH MG: 75 CAPSULE, EXTENDED RELEASE ORAL at 22:59

## 2020-06-26 RX ADMIN — DICYCLOMINE HYDROCHLORIDE SCH MG: 10 CAPSULE ORAL at 22:59

## 2020-06-26 RX ADMIN — CEFAZOLIN SCH MLS/HR: 1 INJECTION, POWDER, FOR SOLUTION INTRAVENOUS at 05:04

## 2020-06-26 RX ADMIN — GABAPENTIN SCH: 100 CAPSULE ORAL at 12:00

## 2020-06-26 RX ADMIN — VENLAFAXINE HYDROCHLORIDE SCH: 75 CAPSULE, EXTENDED RELEASE ORAL at 03:27

## 2020-06-26 RX ADMIN — ACETAMINOPHEN SCH MG: 325 TABLET ORAL at 15:48

## 2020-06-26 RX ADMIN — FERROUS SULFATE TAB EC 324 MG (65 MG FE EQUIVALENT) SCH: 324 (65 FE) TABLET DELAYED RESPONSE at 08:42

## 2020-06-26 RX ADMIN — DOCUSATE SODIUM SCH MG: 100 CAPSULE, LIQUID FILLED ORAL at 14:57

## 2020-06-26 RX ADMIN — TAMSULOSIN HYDROCHLORIDE SCH: 0.4 CAPSULE ORAL at 08:42

## 2020-06-26 RX ADMIN — OXYCODONE HYDROCHLORIDE SCH MG: 10 TABLET, FILM COATED, EXTENDED RELEASE ORAL at 15:47

## 2020-06-26 RX ADMIN — PANTOPRAZOLE SODIUM SCH: 20 TABLET, DELAYED RELEASE ORAL at 12:00

## 2020-06-26 RX ADMIN — MULTIVITAMIN TABLET SCH: TABLET at 12:31

## 2020-06-26 RX ADMIN — HEPARIN SODIUM SCH UNIT: 5000 INJECTION, SOLUTION INTRAVENOUS; SUBCUTANEOUS at 18:34

## 2020-06-26 RX ADMIN — DOCUSATE SODIUM SCH MG: 100 CAPSULE, LIQUID FILLED ORAL at 22:03

## 2020-06-26 RX ADMIN — OXYCODONE HYDROCHLORIDE AND ACETAMINOPHEN SCH: 500 TABLET ORAL at 03:28

## 2020-06-26 RX ADMIN — SODIUM CHLORIDE, POTASSIUM CHLORIDE, SODIUM LACTATE AND CALCIUM CHLORIDE SCH: 600; 310; 30; 20 INJECTION, SOLUTION INTRAVENOUS at 18:27

## 2020-06-26 RX ADMIN — VENLAFAXINE HYDROCHLORIDE SCH MG: 75 CAPSULE, EXTENDED RELEASE ORAL at 14:56

## 2020-06-26 RX ADMIN — ACETAMINOPHEN SCH: 325 TABLET ORAL at 12:31

## 2020-06-26 RX ADMIN — CEFAZOLIN SCH MLS/HR: 1 INJECTION, POWDER, FOR SOLUTION INTRAVENOUS at 22:02

## 2020-06-26 RX ADMIN — MUPIROCIN SCH APPLIC: 20 OINTMENT TOPICAL at 22:03

## 2020-06-26 RX ADMIN — DICYCLOMINE HYDROCHLORIDE SCH: 10 CAPSULE ORAL at 12:00

## 2020-06-26 RX ADMIN — MUPIROCIN SCH APPLIC: 20 OINTMENT TOPICAL at 10:50

## 2020-06-26 RX ADMIN — GABAPENTIN SCH MG: 100 CAPSULE ORAL at 22:04

## 2020-06-26 RX ADMIN — SODIUM CHLORIDE, POTASSIUM CHLORIDE, SODIUM LACTATE AND CALCIUM CHLORIDE SCH MLS/HR: 600; 310; 30; 20 INJECTION, SOLUTION INTRAVENOUS at 12:51

## 2020-06-26 RX ADMIN — CEFAZOLIN SCH MLS/HR: 1 INJECTION, POWDER, FOR SOLUTION INTRAVENOUS at 14:43

## 2020-06-26 RX ADMIN — ATORVASTATIN CALCIUM SCH MG: 40 TABLET, FILM COATED ORAL at 22:04

## 2020-06-26 RX ADMIN — OXYCODONE HYDROCHLORIDE SCH MG: 10 TABLET, FILM COATED, EXTENDED RELEASE ORAL at 22:04

## 2020-06-26 RX ADMIN — DICYCLOMINE HYDROCHLORIDE SCH: 10 CAPSULE ORAL at 03:27

## 2020-06-26 RX ADMIN — OXYCODONE HYDROCHLORIDE AND ACETAMINOPHEN SCH MG: 500 TABLET ORAL at 22:05

## 2020-06-26 RX ADMIN — DOCUSATE SODIUM SCH: 100 CAPSULE, LIQUID FILLED ORAL at 03:27

## 2020-06-26 NOTE — PN
Physical Exam: 


SUBJECTIVE: Patient seen and examined at bedside. He was admitted overnight. 

This AM he is intubated, sedated, and unable to participate in medical 

interview.





OBJECTIVE:





                                   Vital Signs








 Period  Temp  Pulse  Resp  BP Sys/Beard  Pulse Ox


 


 Last 24 Hr  97.2 F-98.6 F  66-94  8-28  /45-91  








GENERAL: The patient is intubated and sedated


HEAD: Normal with no signs of trauma.


EYES: SANDIE, No ptosis. 


ENT: Ears normal, nares patent, ETT in place


NECK: Trachea midline, full range of motion, supple. 


LUNGS: Breath sounds equal, clear to auscultation bilaterally, no wheezes, no 

crackles, no accessory muscle use. 


HEART: Regular rate and rhythm, S1, S2 without murmur, rub or gallop.


ABDOMEN: Soft, nontender, nondistended, normoactive bowel sounds, no guarding, 

no rebound, no hepatosplenomegaly, no masses.


EXTREMITIES: 2+ pulses, warm, well-perfused, no edema. 


NEUROLOGICAL: Cranial nerves II through XII grossly intact. Normal speech, gait 

not observed.


SKIN: Large tattoo on RIGHT arm. Warm, dry, normal turgor, no rashes or lesions 

noted





                         Laboratory Results - last 24 hr








  06/25/20 06/25/20 06/26/20





  06:10 20:30 00:30


 


WBC   15.5 H 


 


RBC   3.66 L 


 


Hgb   10.9 L 


 


Hct   32.3 L 


 


MCV   88.2 


 


MCH   29.7 


 


MCHC   33.6 


 


RDW   14.3 


 


Plt Count   173 


 


MPV   9.2 


 


Absolute Neuts (auto)   14.3 H 


 


Neutrophils %   92.3 H D 


 


Neutrophils % (Manual)   85.1 H 


 


Band Neutrophils %   7.9 


 


Lymphocytes %   3.6 L D 


 


Lymphocytes % (Manual)   2.0 L 


 


Monocytes %   4.0 


 


Monocytes % (Manual)   4 


 


Eosinophils %   0.0  D 


 


Eosinophils % (Manual)   0.0 


 


Basophils %   0.1 


 


Basophils % (Manual)   1.0 


 


Myelocytes % (Man)   0 


 


Promyelocytes % (Man)   0 


 


Blast Cells % (Manual)   0 


 


Nucleated RBC %   0 


 


Metamyelocytes   0 


 


Hypochromia   1+ 


 


Platelet Estimate   Normal 


 


Polychromasia   1+ 


 


Poikilocytosis   1+ 


 


Anisocytosis   0 


 


Microcytosis   0 


 


Macrocytosis   0 


 


Ovalocytes   1+ 


 


Sodium    143


 


Potassium    4.0


 


Chloride    110 H


 


Carbon Dioxide    26


 


Anion Gap    6 L


 


BUN    21.8 H


 


Creatinine    0.9


 


Est GFR (CKD-EPI)AfAm    98.55


 


Est GFR (CKD-EPI)NonAf    85.03


 


Random Glucose    128 H


 


Calcium    7.2 L


 


Blood Type  B POSITIVE  


 


Antibody Screen  Negative  


 


Crossmatch  See Detail  














  06/26/20 06/26/20





  06:05 06:05


 


WBC  13.0 H 


 


RBC  3.43 L 


 


Hgb  10.1 L 


 


Hct  30.4 L 


 


MCV  88.6 


 


MCH  29.5 


 


MCHC  33.3 


 


RDW  14.4 


 


Plt Count  156 


 


MPV  9.0 


 


Absolute Neuts (auto)  


 


Neutrophils %  


 


Neutrophils % (Manual)  


 


Band Neutrophils %  


 


Lymphocytes %  


 


Lymphocytes % (Manual)  


 


Monocytes %  


 


Monocytes % (Manual)  


 


Eosinophils %  


 


Eosinophils % (Manual)  


 


Basophils %  


 


Basophils % (Manual)  


 


Myelocytes % (Man)  


 


Promyelocytes % (Man)  


 


Blast Cells % (Manual)  


 


Nucleated RBC %  


 


Metamyelocytes  


 


Hypochromia  


 


Platelet Estimate  


 


Polychromasia  


 


Poikilocytosis  


 


Anisocytosis  


 


Microcytosis  


 


Macrocytosis  


 


Ovalocytes  


 


Sodium   143


 


Potassium   4.0


 


Chloride   109 H


 


Carbon Dioxide   30


 


Anion Gap   4 L


 


BUN   20.4 H


 


Creatinine   0.9


 


Est GFR (CKD-EPI)AfAm   98.55


 


Est GFR (CKD-EPI)NonAf   85.03


 


Random Glucose   124 H


 


Calcium   7.5 L


 


Blood Type  


 


Antibody Screen  


 


Crossmatch  








Active Medications








Generic Name Dose Route Start Last Admin





  Trade Name Freq  PRN Reason Stop Dose Admin


 


Acetaminophen  650 mg  06/25/20 16:15  06/26/20 12:31





  Tylenol -  PO   Not Given





  Q6H [facility]  


 


Ascorbic Acid  500 mg  06/25/20 22:00  06/26/20 12:31





  Vitamin C -  PO   Not Given





  BID [facility]  


 


Atorvastatin Calcium  40 mg  06/25/20 22:00  06/26/20 03:28





  Lipitor -  PO   Not Given





  HS ANTELMO  


 


Chlorhexidine Gluconate  1 applic  06/25/20 22:00 





  Hibiclens For Decolonization -  TP  





  HS ANTELMO  


 


Clonazepam  1 mg  06/25/20 22:00  06/26/20 03:27





  Klonopin -  PO   Not Given





  BID ANTELMO  


 


Dicyclomine HCl  10 mg  06/25/20 22:00  06/26/20 03:27





  Bentyl -  PO   Not Given





  BID ANTELMO  


 


Diphenhydramine HCl  25 mg  06/25/20 16:15 





  Benadryl Injection -  IVPUSH  





  ONCE PRN  





  FOR ITCHING  


 


Diphenhydramine HCl  25 mg  06/25/20 16:24 





  Benadryl -  PO  





  Q6H PRN  





  FOR ITCHING  


 


Docusate Sodium  100 mg  06/25/20 22:00  06/26/20 05:04





  Colace -  PO   Not Given





  TID [facility]  


 


Fentanyl  25 mcg  06/25/20 18:25  06/25/20 19:10





  Sublimaze Injection -  IVPUSH   25 mcg





  [id number] PRN   Administration





  PAIN-PACU ORDER X 4 DOSES ONLY  


 


Ferrous Sulfate  325 mg  06/26/20 08:00  06/26/20 08:42





  Feosol -  PO   Not Given





  DAILY@0800 [facility]  


 


Folic Acid  1 mg  06/26/20 10:00  06/26/20 12:32





  Folic Acid -  PO   Not Given





  DAILY [facility]  


 


Gabapentin  200 mg  06/25/20 22:00  06/26/20 03:28





  Neurontin -  PO   Not Given





  BID [facility]  


 


Heparin Sodium (Porcine)  5,000 unit  06/26/20 10:00  06/26/20 12:00





  Heparin -  SQ   5,000 unit





  Q8H ANTELMO   Administration


 


Hydrochlorothiazide  25 mg  06/26/20 10:00  06/26/20 12:32





  Hctz -  PO   Not Given





  DAILY [facility]  


 


Propofol  1,000,000 mcg in 100 mls @ 2.449 mls/hr  06/25/20 16:30  06/26/20 

09:00





  Diprivan -  IVPB   0 mcg/kg/min





  TITR ANTELMO   0 mls/hr





    Titration





  Protocol  





  5 MCG/KG/MIN  


 


Lactated Ringer's  1,000 ml in 1,000 mls @ 75 mls/hr  06/25/20 16:30  06/26/20 

12:51





  Lactated Ringers Solution  IV   75 mls/hr





  ASDIR ANTELMO   Administration


 


Cefazolin Sodium 1 gm/  50 mls @ 100 mls/hr  06/25/20 22:00  06/26/20 05:04





  Dextrose  IVPB   100 mls/hr





  TID ANTELMO   Administration


 


Fentanyl  500 mcg in 100 mls @ 16.329 mls/hr  06/25/20 22:45  06/26/20 13:23





  Sublimaze Ivpb  IVPB  06/26/20 22:44  0 mcg/kg/hr





  TITR ANTELMO   0 mls/hr





    Titration





  Protocol  





  1 MCG/KG/HR  


 


Multivitamins/Minerals/Vitamin C  1 tab  06/26/20 10:00  06/26/20 12:31





  Tab-A-Vit -  PO   Not Given





  DAILY [facility]  


 


Mupirocin  1 applic  06/25/20 22:00  06/26/20 10:50





  Bactroban Ointment (For Decolonization) -  NS  06/30/20 21:59  1 applic





  BID ANTELMO   Administration


 


Naloxone HCl  0.4 mg  06/25/20 16:15 





  Narcan -  IVPUSH  





  ONCE PRN  





  Sedation  


 


Ondansetron HCl  4 mg  06/25/20 16:24 





  Zofran Injection  IVPUSH  





  Q6H PRN  





  NAUSEA  


 


Oxycodone HCl  10 mg  06/26/20 16:17 





  Oxycontin -  PO  





  BID ANTELMO  


 


Pantoprazole Sodium  20 mg  06/26/20 10:00  06/26/20 12:00





  Protonix -  PO   Not Given





  DAILY [facility]  


 


Tamsulosin HCl  0.4 mg  06/26/20 08:30  06/26/20 08:42





  Flomax -  PO   Not Given





  0830 ANTELMO  


 


Venlafaxine HCl  75 mg  06/25/20 22:00  06/26/20 03:27





  Effexor Xr -  PO   Not Given





  BID [facility]  








ASSESSMENT/PLAN:


75 y/o male PMH htn, hld, pacs and pvcs, svt s/p ablation 2017 here for elective

spine surgery for lumbar spondylosis and is now s/p T12-S2 laminectomies, 

decompression, osteotomies, deformity correction, and fusion POD 1. 





#Neuro


Propofol 25


Fentanyl 75


gabapentin


Tylenol Q6H





#Pulmonary


ETT now in appropriate location. Plan to extubate this AM. 


AC 12/450/40/5 plat 20





#CVS


Hold antihypertensives in the setting of low BP


Lipitor 40





#GI


protonix 


zofran; monitor QTc


docusate





#ID


leukocytosis likely reactive to surgery 





#


flomax





#Psych


Unable to verify dx


Effexor 


Klonopin 





#Heme


Normocytic anemia 


cont ferrous sulfate


cont folic acid 





#PPX


hep sq


Protonix





#FEN


LR at 75


Monitor and replete electrolytes


Clear liquid diet





#TLD


Christine


[facility]





#Disposition


ICU 


Full code





Visit type





- Emergency Visit


Emergency Visit: No





- New Patient


This patient is new to me today: Yes


Date on this admission: 06/26/20





- Critical Care


Critical Care patient: Yes


Total Critical Care Time (in minutes): 36


Critical Care Statement: The care of this patient involved high complexity 

decision making to prevent further life threatening deterioration of the 

patient's condition and/or to evaluate & treat vital organ system(s) failure or 

risk of failure.





ATTENDING PHYSICIAN STATEMENT





I saw and evaluated the patient.


I reviewed the resident's note and discussed the case with the resident.


I agree with the resident's findings and plan as documented.








SUBJECTIVE:








OBJECTIVE:








ASSESSMENT AND PLAN:

## 2020-06-26 NOTE — PN
Progress Note (short form)





- Note


Progress Note: 





Surgery





POD #1  T12-S2 Laminectomies with L34, L45 and L5S1 transpedicular decompression

and osteotomies and deformity correction T12-S2 pedicle screw fusion with 

athrodesis and interbody cage at L5-S1. seen on AM rounds. Patient received 1 

unit of PRBC in the OR and remained intubated overnight. No nursing  reports no 

overnight issues.








                                   Vital Signs











Temp  98.4 F   06/26/20 10:00


 


Pulse  94 H  06/26/20 12:00


 


Resp  23 H  06/26/20 12:00


 


BP  151/63   06/26/20 12:00


 


Pulse Ox  100   06/26/20 09:00








                                 Intake & Output











 06/25/20 06/26/20 06/26/20





 23:59 11:59 23:59


 


Intake Total 2057 1445 


 


Output Total 1005 450 80


 


Balance 1052 995 -80


 


Weight   180 lb


 


Intake:   


 


  IV 1357 1245 


 


    DIPRIVAN - 1,000,000 mcg  119 





    In 100 ml @ 5 MCG/KG/MIN   





    2.449 mls/hr IVPB TITR   





    ANTELMO Rx#:[id number]   


 


    LACTATED RINGERS SOLUTION  1005 





    1,000 ml In 1,000 ml @   





    75 mls/hr IV ASDIR ANTELMO Rx   





    #:[id number]   


 


    SUBLIMAZE IVPB 500 mcg In  121 





    100 ml @ 1 MCG/KG/HR 16.   





    329 mls/hr IVPB TITR ANTELMO   





    Rx#:[id number]   


 


  IVPB  200 


 


  Blood Product 700  


 


Output:   


 


  Drainage 505 200 80


 


    Back  200 80


 


  Urine 500 250 


 


    King  250 


 


Other:   


 


  Voiding Method Indwelling Catheter Indwelling Catheter 


 


  Bowel Movement  No 


 


  Height   5 ft 6 in


 


  Body Mass Index (BMI)   29.0








                                    CBC, BMP





                                 06/26/20 06:05 





                                 06/26/20 06:05 











PE:


NAD


Intubated on a vent, unlabored resp


ABD: Soft, ND


B/L LE compartments soft, supple with +DP pulses





Problem List





- Problems


(1) S/P laminectomy


Assessment/Plan: 


73yo s/p multilevel decompression and fusion with re-op stable. Team plans to 

wean to extubate this morning.





-DVT prophylaxis


-OOB with TLSO brace


-Pain control 


-OOB with PT


-Monitor drain


-surgery to follow





Evaluation and plan discussed with Dr Gray











Code(s): Z98.890 - OTHER SPECIFIED POSTPROCEDURAL STATES

## 2020-06-26 NOTE — PN
Teaching Attending Note


Name of Resident: Bill Stein





ATTENDING PHYSICIAN STATEMENT





I saw and evaluated the patient.


I reviewed the resident's note and discussed the case with the resident.


I agree with the resident's findings and plan as documented.








SUBJECTIVE:


Patient seen and examined in the ICU. 


Remains intubated and sedated. 


AC Mode of vent. 





Apparently facial edema improved. 





No pressors. 





                                 Intake & Output











 06/23/20 06/24/20 06/25/20 06/26/20





 23:59 23:59 23:59 23:59


 


Intake Total   5257 1445


 


Output Total   2005 530


 


Balance   3252 915


 


Weight  180 lb  180 lb








                                Last Vital Signs











Temp Pulse Resp BP Pulse Ox


 


 98.4 F   94 H  23 H  151/63   100 


 


 06/26/20 10:00  06/26/20 12:00  06/26/20 12:00  06/26/20 12:00  06/26/20 09:00








Active Medications





Acetaminophen (Tylenol -)  650 mg PO [facility]H [facility]


   Last Admin: 06/26/20 12:31 Dose:  Not Given


   Documented by: 


Ascorbic Acid (Vitamin C -)  500 mg PO BID [facility]


   Last Admin: 06/26/20 12:31 Dose:  Not Given


   Documented by: 


Atorvastatin Calcium (Lipitor -)  40 mg PO [facility]


   Last Admin: 06/26/20 03:28 Dose:  Not Given


   Documented by: 


Chlorhexidine Gluconate (Hibiclens For Decolonization -)  1 applic TP [facility]


Clonazepam (Klonopin -)  1 mg PO BID [facility]


   Last Admin: 06/26/20 03:27 Dose:  Not Given


   Documented by: 


Dicyclomine HCl (Bentyl -)  10 mg PO BID [facility]


   Last Admin: 06/26/20 12:00 Dose:  Not Given


   Documented by: 


Diphenhydramine HCl (Benadryl Injection -)  25 mg IVPUSH ONCE PRN


   PRN Reason: FOR ITCHING


Diphenhydramine HCl (Benadryl -)  25 mg PO Q6H PRN


   PRN Reason: FOR ITCHING


Docusate Sodium (Colace -)  100 mg PO TID [facility]


   Last Admin: 06/26/20 05:04 Dose:  Not Given


   Documented by: 


Ferrous Sulfate (Feosol -)  325 mg PO DAILY@0800 [facility]


   Last Admin: 06/26/20 08:42 Dose:  Not Given


   Documented by: 


Folic Acid (Folic Acid -)  1 mg PO DAILY [facility]


   Last Admin: 06/26/20 12:32 Dose:  Not Given


   Documented by: 


Gabapentin (Neurontin -)  200 mg PO BID [facility]


   Last Admin: 06/26/20 12:00 Dose:  Not Given


   Documented by: 


Heparin Sodium (Porcine) (Heparin -)  5,000 unit SQ Q8H [facility]


   Last Admin: 06/26/20 12:00 Dose:  5,000 unit


   Documented by: 


Hydrochlorothiazide (Hctz -)  25 mg PO DAILY [facility]


   Last Admin: 06/26/20 12:32 Dose:  Not Given


   Documented by: 


Propofol (Diprivan -)  1,000,000 mcg in 100 mls @ 2.449 mls/hr IVPB TITR [facility]; 

Protocol


   Last Titration: 06/26/20 09:00 Dose:  0 mcg/kg/min, 0 mls/hr


   Documented by: 


Lactated Ringer's (Lactated Ringers Solution)  1,000 ml in 1,000 mls @ 75 mls/hr

IV ASDIR [facility]


   Last Admin: 06/26/20 12:51 Dose:  75 mls/hr


   Documented by: 


Cefazolin Sodium 1 gm/ (Dextrose)  50 mls @ 100 mls/hr IVPB TID [facility]


   Last Admin: 06/26/20 05:04 Dose:  100 mls/hr


   Documented by: 


Fentanyl (Sublimaze Ivpb)  500 mcg in 100 mls @ 16.329 mls/hr IVPB TITR [facility]; 

Protocol


   Stop: 06/26/20 22:44


   Last Titration: 06/26/20 13:23 Dose:  0 mcg/kg/hr, 0 mls/hr


   Documented by: 


Multivitamins/Minerals/Vitamin C (Tab-A-Vit -)  1 tab PO DAILY [facility]


   Last Admin: 06/26/20 12:31 Dose:  Not Given


   Documented by: 


Mupirocin (Bactroban Ointment (For Decolonization) -)  1 applic NS BID [facility]


   Stop: 06/30/20 21:59


   Last Admin: 06/26/20 10:50 Dose:  1 applic


   Documented by: 


Naloxone HCl (Narcan -)  0.4 mg IVPUSH ONCE PRN


   PRN Reason: Sedation


Ondansetron HCl (Zofran Injection)  4 mg IVPUSH Q6H PRN


   PRN Reason: NAUSEA


Oxycodone HCl (Oxycontin -)  10 mg PO BID [facility]


Pantoprazole Sodium (Protonix -)  20 mg PO DAILY [facility]


   Last Admin: 06/26/20 12:00 Dose:  Not Given


   Documented by: 


Tamsulosin HCl (Flomax -)  0.4 mg PO 0830 [facility]


   Last Admin: 06/26/20 08:42 Dose:  Not Given


   Documented by: 


Venlafaxine HCl (Effexor Xr -)  75 mg PO BID [facility]


   Last Admin: 06/26/20 03:27 Dose:  Not Given


   Documented by: 








GENERAL: intubated and sedated


HEAD: Normal with no signs of trauma.


EYES: SANDIE, No ptosis. 


ENT: Ears normal, nares patent, ETT in place


NECK: Trachea midline, full range of motion, supple. 


LUNGS: Vented, clear to auscultation bilaterally, no wheezes, no crackles, no 

accessory muscle use. 


HEART: Regular rate and rhythm, S1, S2 without murmur, rub or gallop.


ABDOMEN: Soft, nontender, nondistended, normoactive bowel sounds, no guarding, 

no rebound, no hepatosplenomegaly, no masses.


EXTREMITIES: 2+ pulses, warm, well-perfused, no edema. 


NEUROLOGICAL: Sedated, non-focal 


SKIN: warm, dry, normal turgor, no rashes or lesions noted





                         Laboratory Results - last 24 hr








  06/25/20 06/25/20 06/26/20





  06:10 20:30 00:30


 


WBC   15.5 H 


 


RBC   3.66 L 


 


Hgb   10.9 L 


 


Hct   32.3 L 


 


MCV   88.2 


 


MCH   29.7 


 


MCHC   33.6 


 


RDW   14.3 


 


Plt Count   173 


 


MPV   9.2 


 


Absolute Neuts (auto)   14.3 H 


 


Neutrophils %   92.3 H D 


 


Neutrophils % (Manual)   85.1 H 


 


Band Neutrophils %   7.9 


 


Lymphocytes %   3.6 L D 


 


Lymphocytes % (Manual)   2.0 L 


 


Monocytes %   4.0 


 


Monocytes % (Manual)   4 


 


Eosinophils %   0.0  D 


 


Eosinophils % (Manual)   0.0 


 


Basophils %   0.1 


 


Basophils % (Manual)   1.0 


 


Myelocytes % (Man)   0 


 


Promyelocytes % (Man)   0 


 


Blast Cells % (Manual)   0 


 


Nucleated RBC %   0 


 


Metamyelocytes   0 


 


Hypochromia   1+ 


 


Platelet Estimate   Normal 


 


Polychromasia   1+ 


 


Poikilocytosis   1+ 


 


Anisocytosis   0 


 


Microcytosis   0 


 


Macrocytosis   0 


 


Ovalocytes   1+ 


 


Sodium    143


 


Potassium    4.0


 


Chloride    110 H


 


Carbon Dioxide    26


 


Anion Gap    6 L


 


BUN    21.8 H


 


Creatinine    0.9


 


Est GFR (CKD-EPI)AfAm    98.55


 


Est GFR (CKD-EPI)NonAf    85.03


 


Random Glucose    128 H


 


Calcium    7.2 L


 


Blood Type  B POSITIVE  


 


Antibody Screen  Negative  


 


Crossmatch  See Detail  














  06/26/20 06/26/20





  06:05 06:05


 


WBC  13.0 H 


 


RBC  3.43 L 


 


Hgb  10.1 L 


 


Hct  30.4 L 


 


MCV  88.6 


 


MCH  29.5 


 


MCHC  33.3 


 


RDW  14.4 


 


Plt Count  156 


 


MPV  9.0 


 


Absolute Neuts (auto)  


 


Neutrophils %  


 


Neutrophils % (Manual)  


 


Band Neutrophils %  


 


Lymphocytes %  


 


Lymphocytes % (Manual)  


 


Monocytes %  


 


Monocytes % (Manual)  


 


Eosinophils %  


 


Eosinophils % (Manual)  


 


Basophils %  


 


Basophils % (Manual)  


 


Myelocytes % (Man)  


 


Promyelocytes % (Man)  


 


Blast Cells % (Manual)  


 


Nucleated RBC %  


 


Metamyelocytes  


 


Hypochromia  


 


Platelet Estimate  


 


Polychromasia  


 


Poikilocytosis  


 


Anisocytosis  


 


Microcytosis  


 


Macrocytosis  


 


Ovalocytes  


 


Sodium   143


 


Potassium   4.0


 


Chloride   109 H


 


Carbon Dioxide   30


 


Anion Gap   4 L


 


BUN   20.4 H


 


Creatinine   0.9


 


Est GFR (CKD-EPI)AfAm   98.55


 


Est GFR (CKD-EPI)NonAf   85.03


 


Random Glucose   124 H


 


Calcium   7.5 L


 


Blood Type  


 


Antibody Screen  


 


Crossmatch  








ASSESSMENT/PLAN:


Acute Respiratory Insufficiency 


POD #1: S/P T12 to S2 laminectomies, decompression, and fusion 


HTN 


HPL 


History of PACs & PVCs & SVT S/P ablation 2017





Hold all sedation 


CPAP trial when more awake and able to follow commands 


VTE prophylaxis 


Pain control 


PPI 


Normal transfusion thresholds 


Follow Neuro exam 


Requires ICU monitoring 





Dr Chaves 


Critical care time spent in reviewing chart, evaluating patient and formulating 

plan - 36 minutes.

## 2020-06-26 NOTE — PN
Progress Note, Physician


Chief Complaint: 





S/p laminectomy


Intubated, sedated on vent. FiO2 40%


History of Present Illness: 





History of SVT with prior ablation


TELE: Irregular- SR with frequent APCs vs AF difficult to determine.


Needs 12 lead ECG





- Current Medication List


Current Medications: 


Active Medications





Acetaminophen (Tylenol -)  650 mg PO Q6H [facility]


   Last Admin: 06/26/20 05:52 Dose:  Not Given


   Documented by: 


Ascorbic Acid (Vitamin C -)  500 mg PO BID [facility]


   Last Admin: 06/26/20 03:28 Dose:  Not Given


   Documented by: 


Atorvastatin Calcium (Lipitor -)  40 mg PO HS [facility]


   Last Admin: 06/26/20 03:28 Dose:  Not Given


   Documented by: 


Chlorhexidine Gluconate (Hibiclens For Decolonization -)  1 applic TP [facility]


Clonazepam (Klonopin -)  1 mg PO BID [facility]


   Last Admin: 06/26/20 03:27 Dose:  Not Given


   Documented by: 


Dicyclomine HCl (Bentyl -)  10 mg PO BID [facility]


   Last Admin: 06/26/20 03:27 Dose:  Not Given


   Documented by: 


Diphenhydramine HCl (Benadryl Injection -)  25 mg IVPUSH ONCE PRN


   PRN Reason: FOR ITCHING


Diphenhydramine HCl (Benadryl -)  25 mg PO Q6H PRN


   PRN Reason: FOR ITCHING


Docusate Sodium (Colace -)  100 mg PO TID [facility]


   Last Admin: 06/26/20 05:04 Dose:  Not Given


   Documented by: 


Fentanyl (Sublimaze Injection -)  25 mcg IVPUSH [id number] PRN


   PRN Reason: PAIN-PACU ORDER X 4 DOSES ONLY


   Last Admin: 06/25/20 19:10 Dose:  25 mcg


   Documented by: 


Ferrous Sulfate (Feosol -)  325 mg PO DAILY@0800 [facility]


Folic Acid (Folic Acid -)  1 mg PO DAILY [facility]


Gabapentin (Neurontin -)  200 mg PO BID [facility]


   Last Admin: 06/26/20 03:28 Dose:  Not Given


   Documented by: 


Heparin Sodium (Porcine) (Heparin -)  5,000 unit SQ Q8H [facility]


Hydrochlorothiazide (Hctz -)  25 mg PO DAILY [facility]


Propofol (Diprivan -)  1,000,000 mcg in 100 mls @ 2.449 mls/hr IVPB TITR [facility]; 

Protocol


   Last Admin: 06/25/20 16:30 Dose:  24 mls


   Documented by: 


Lactated Ringer's (Lactated Ringers Solution)  1,000 ml in 1,000 mls @ 75 mls/hr

IV ASDIR [facility]


   Last Admin: 06/25/20 22:09 Dose:  333 mls


   Documented by: 


Cefazolin Sodium 1 gm/ (Dextrose)  50 mls @ 100 mls/hr IVPB TID [facility]


   Last Admin: 06/26/20 05:04 Dose:  100 mls/hr


   Documented by: 


Fentanyl (Sublimaze Ivpb)  500 mcg in 100 mls @ 16.329 mls/hr IVPB TITR [facility]; 

Protocol


   Stop: 06/26/20 22:44


   Last Admin: 06/25/20 23:18 Dose:  1 mcg/kg/hr, 16.329 mls/hr


   Documented by: 


Multivitamins/Minerals/Vitamin C (Tab-A-Vit -)  1 tab PO DAILY [facility]


Mupirocin (Bactroban Ointment (For Decolonization) -)  1 applic NS BID [facility]


   Stop: 06/30/20 21:59


   Last Admin: 06/25/20 23:04 Dose:  1 applic


   Documented by: 


Naloxone HCl (Narcan -)  0.4 mg IVPUSH ONCE PRN


   PRN Reason: Sedation


Ondansetron HCl (Zofran Injection)  4 mg IVPUSH Q6H PRN


   PRN Reason: NAUSEA


Oxycodone HCl (Oxycontin -)  10 mg PO BID [facility]


Pantoprazole Sodium (Protonix -)  20 mg PO DAILY [facility]


Tamsulosin HCl (Flomax -)  0.4 mg PO 0830 [facility]


Venlafaxine HCl (Effexor Xr -)  75 mg PO BID [facility]


   Last Admin: 06/26/20 03:27 Dose:  Not Given


   Documented by: 











- Objective


Vital Signs: 


                                   Vital Signs











Temperature  97.8 F   06/26/20 02:00


 


Pulse Rate  66   06/26/20 04:00


 


Respiratory Rate  14   06/26/20 05:00


 


Blood Pressure  100/53 L  06/26/20 04:00


 


O2 Sat by Pulse Oximetry (%)  100   06/26/20 05:00











Constitutional: Yes: Other (intubated, sedated)


Cardiovascular: Yes: Pulse Irregular


Respiratory: Yes: Other (= breath sounds bilaterally)


Gastrointestinal: Yes: Soft


Edema: No


Labs: 


                                    CBC, BMP





                                 06/25/20 20:30 





                                 06/26/20 00:30 





                                Laboratory Tests











  06/26/20 06/26/20





  06:05 06:05


 


WBC  13.0 H 


 


Hgb  10.1 L 


 


Plt Count  156 


 


Sodium   143


 


Potassium   4.0


 


Creatinine   0.9














- ....Imaging


EKG: Image Reviewed





Assessment/Plan





a/p:  74 m hx htn, hld, hx pacs and pvcs, svt s/p ablation 2017, s/p elective 

spine surgery.





abnl ecg:


-On tele difficult to distinguish if NSR with frequent APCS vs AF.


-Needs 12 lead ECG, will order.








htn:


-cont home meds





hld:


-cont home med





svt s/p ablation:


-has been in sr here








s/p laminectomy:


-Post op management as per Critical Care an Neurosurgery

## 2020-06-26 NOTE — PN
Progress Note (short form)





- Note


Progress Note: 





Paged by the nurse that the patient was complaining of abdominal pain. The 

patient also endorsed that he had not voided since his Padron was removed during 

the day shift ~5 hrs ago. Nurse completed bladder scan which showed >500ml 

urine. Will replace padron and attempt TOV in am.

## 2020-06-26 NOTE — PN
Progress Note, Physician


History of Present Illness: 


Pt seen/ examined in icu 


chart is reviewed


pod # 1 - Laminectomy


Intubated


Arousable


comfortable














- Current Medication List


Current Medications: 


Active Medications





Acetaminophen (Tylenol -)  650 mg PO Q6H [facility]


   Last Admin: 06/26/20 05:52 Dose:  Not Given


   Documented by: 


Ascorbic Acid (Vitamin C -)  500 mg PO BID [facility]


   Last Admin: 06/26/20 03:28 Dose:  Not Given


   Documented by: 


Atorvastatin Calcium (Lipitor -)  40 mg PO HS [facility]


   Last Admin: 06/26/20 03:28 Dose:  Not Given


   Documented by: 


Chlorhexidine Gluconate (Hibiclens For Decolonization -)  1 applic TP [facility]


Clonazepam (Klonopin -)  1 mg PO BID [facility]


   Last Admin: 06/26/20 03:27 Dose:  Not Given


   Documented by: 


Dicyclomine HCl (Bentyl -)  10 mg PO BID [facility]


   Last Admin: 06/26/20 03:27 Dose:  Not Given


   Documented by: 


Diphenhydramine HCl (Benadryl Injection -)  25 mg IVPUSH ONCE PRN


   PRN Reason: FOR ITCHING


Diphenhydramine HCl (Benadryl -)  25 mg PO Q6H PRN


   PRN Reason: FOR ITCHING


Docusate Sodium (Colace -)  100 mg PO TID [facility]


   Last Admin: 06/26/20 05:04 Dose:  Not Given


   Documented by: 


Fentanyl (Sublimaze Injection -)  25 mcg IVPUSH [id number] PRN


   PRN Reason: PAIN-PACU ORDER X 4 DOSES ONLY


   Last Admin: 06/25/20 19:10 Dose:  25 mcg


   Documented by: 


Ferrous Sulfate (Feosol -)  325 mg PO DAILY@0800 [facility]


   Last Admin: 06/26/20 08:42 Dose:  Not Given


   Documented by: 


Folic Acid (Folic Acid -)  1 mg PO DAILY [facility]


Gabapentin (Neurontin -)  200 mg PO BID [facility]


   Last Admin: 06/26/20 03:28 Dose:  Not Given


   Documented by: 


Heparin Sodium (Porcine) (Heparin -)  5,000 unit SQ Q8H [facility]


Hydrochlorothiazide (Hctz -)  25 mg PO DAILY [facility]


Propofol (Diprivan -)  1,000,000 mcg in 100 mls @ 2.449 mls/hr IVPB TITR [facility]; 

Protocol


   Last Admin: 06/25/20 16:30 Dose:  24 mls


   Documented by: 


Lactated Ringer's (Lactated Ringers Solution)  1,000 ml in 1,000 mls @ 75 mls/hr

IV ASDIR [facility]


   Last Admin: 06/25/20 22:09 Dose:  333 mls


   Documented by: 


Cefazolin Sodium 1 gm/ (Dextrose)  50 mls @ 100 mls/hr IVPB TID [facility]


   Last Admin: 06/26/20 05:04 Dose:  100 mls/hr


   Documented by: 


Fentanyl (Sublimaze Ivpb)  500 mcg in 100 mls @ 16.329 mls/hr IVPB TITR [facility]; 

Protocol


   Stop: 06/26/20 22:44


   Last Admin: 06/25/20 23:18 Dose:  1 mcg/kg/hr, 16.329 mls/hr


   Documented by: 


Multivitamins/Minerals/Vitamin C (Tab-A-Vit -)  1 tab PO DAILY [facility]


Mupirocin (Bactroban Ointment (For Decolonization) -)  1 applic NS BID [facility]


   Stop: 06/30/20 21:59


   Last Admin: 06/26/20 10:50 Dose:  1 applic


   Documented by: 


Naloxone HCl (Narcan -)  0.4 mg IVPUSH ONCE PRN


   PRN Reason: Sedation


Ondansetron HCl (Zofran Injection)  4 mg IVPUSH Q6H PRN


   PRN Reason: NAUSEA


Oxycodone HCl (Oxycontin -)  10 mg PO BID [facility]


Pantoprazole Sodium (Protonix -)  20 mg PO DAILY [facility]


Tamsulosin HCl (Flomax -)  0.4 mg PO 0830 [facility]


   Last Admin: 06/26/20 08:42 Dose:  Not Given


   Documented by: 


Venlafaxine HCl (Effexor Xr -)  75 mg PO BID [facility]


   Last Admin: 06/26/20 03:27 Dose:  Not Given


   Documented by: 











- Objective


Vital Signs: 


                                   Vital Signs











Temperature  98.4 F   06/26/20 10:00


 


Pulse Rate  79   06/26/20 10:00


 


Respiratory Rate  25 H  06/26/20 10:00


 


Blood Pressure  131/65   06/26/20 10:00


 


O2 Sat by Pulse Oximetry (%)  100   06/26/20 09:00











Constitutional: Yes: No Distress


Eyes: Yes: Conjunctiva Clear


Neck: Yes: Other (Intubated)


Cardiovascular: Yes: Regular Rate and Rhythm


Respiratory: Yes: CTA Bilaterally


Gastrointestinal: Yes: Soft


Edema: No


Labs: 


                                    CBC, BMP





                                 06/26/20 06:05 





                                 06/26/20 06:05 











Problem List





- Problems


(1) S/P laminectomy


Assessment/Plan: 


icu


intubated


pain control


weaning as tolerated


icu team following





Problems reviewed: Yes   


Code(s): Z98.890 - OTHER SPECIFIED POSTPROCEDURAL STATES   





(2) Hepatitis C


Assessment/Plan: 


s/p treatment


Problems reviewed: Yes   


Code(s): B19.20 - UNSPECIFIED VIRAL HEPATITIS C WITHOUT HEPATIC COMA   





(3) History of cholecystectomy


Assessment/Plan: 


no active issues


Problems reviewed: Yes   


Code(s): Z90.49 - ACQUIRED ABSENCE OF OTHER SPECIFIED PARTS OF DIGESTIVE TRACT  







(4) History of penile implant


Assessment/Plan: 


No active issues


Problems reviewed: Yes   


Code(s): Z96.0 - PRESENCE OF UROGENITAL IMPLANTS   





(5) History of cardiac radiofrequency ablation


Assessment/Plan: 


cardiology following


Code(s): Z98.890 - OTHER SPECIFIED POSTPROCEDURAL STATES   





(6) Hypertension


Assessment/Plan: 


Monitor


Problems reviewed: Yes   


Code(s): I10 - ESSENTIAL (PRIMARY) HYPERTENSION   





(7) Generalized anxiety disorder


Problems reviewed: Yes   


Code(s): F41.1 - GENERALIZED ANXIETY DISORDER   





(8) PTSD (post-traumatic stress disorder)


Problems reviewed: Yes   


Code(s): F43.10 - POST-TRAUMATIC STRESS DISORDER, UNSPECIFIED

## 2020-06-27 LAB
ALBUMIN SERPL-MCNC: 2.4 G/DL (ref 3.4–5)
ALP SERPL-CCNC: 69 U/L (ref 45–117)
ALT SERPL-CCNC: 22 U/L (ref 13–61)
ANION GAP SERPL CALC-SCNC: 4 MMOL/L (ref 8–16)
AST SERPL-CCNC: 29 U/L (ref 15–37)
BILIRUB SERPL-MCNC: 0.6 MG/DL (ref 0.2–1)
BUN SERPL-MCNC: 14.5 MG/DL (ref 7–18)
CALCIUM SERPL-MCNC: 7.6 MG/DL (ref 8.5–10.1)
CHLORIDE SERPL-SCNC: 107 MMOL/L (ref 98–107)
CO2 SERPL-SCNC: 31 MMOL/L (ref 21–32)
CREAT SERPL-MCNC: 0.7 MG/DL (ref 0.55–1.3)
DEPRECATED RDW RBC AUTO: 14.6 % (ref 11.9–15.9)
GLUCOSE SERPL-MCNC: 105 MG/DL (ref 74–106)
HCT VFR BLD CALC: 24.7 % (ref 35.4–49)
HGB BLD-MCNC: 8.3 GM/DL (ref 11.7–16.9)
MAGNESIUM SERPL-MCNC: 2 MG/DL (ref 1.8–2.4)
MCH RBC QN AUTO: 29.3 PG (ref 25.7–33.7)
MCHC RBC AUTO-ENTMCNC: 33.5 G/DL (ref 32–35.9)
MCV RBC: 87.5 FL (ref 80–96)
PHOSPHATE SERPL-MCNC: 2 MG/DL (ref 2.5–4.9)
PLATELET # BLD AUTO: 114 K/MM3 (ref 134–434)
PMV BLD: 9 FL (ref 7.5–11.1)
POTASSIUM SERPLBLD-SCNC: 3.4 MMOL/L (ref 3.5–5.1)
PROT SERPL-MCNC: 4.8 G/DL (ref 6.4–8.2)
RBC # BLD AUTO: 2.82 M/MM3 (ref 4–5.6)
SODIUM SERPL-SCNC: 141 MMOL/L (ref 136–145)
WBC # BLD AUTO: 11.2 K/MM3 (ref 4–10)

## 2020-06-27 RX ADMIN — OXYCODONE HYDROCHLORIDE AND ACETAMINOPHEN SCH MG: 500 TABLET ORAL at 09:19

## 2020-06-27 RX ADMIN — OXYCODONE HYDROCHLORIDE AND ACETAMINOPHEN SCH MG: 500 TABLET ORAL at 21:32

## 2020-06-27 RX ADMIN — CEFAZOLIN SCH MLS/HR: 1 INJECTION, POWDER, FOR SOLUTION INTRAVENOUS at 13:51

## 2020-06-27 RX ADMIN — VENLAFAXINE HYDROCHLORIDE SCH MG: 75 CAPSULE, EXTENDED RELEASE ORAL at 09:16

## 2020-06-27 RX ADMIN — FERROUS SULFATE TAB EC 324 MG (65 MG FE EQUIVALENT) SCH MG: 324 (65 FE) TABLET DELAYED RESPONSE at 09:00

## 2020-06-27 RX ADMIN — DOCUSATE SODIUM SCH MG: 100 CAPSULE, LIQUID FILLED ORAL at 13:51

## 2020-06-27 RX ADMIN — DICYCLOMINE HYDROCHLORIDE SCH MG: 10 CAPSULE ORAL at 09:15

## 2020-06-27 RX ADMIN — VENLAFAXINE HYDROCHLORIDE SCH MG: 75 CAPSULE, EXTENDED RELEASE ORAL at 21:30

## 2020-06-27 RX ADMIN — DICYCLOMINE HYDROCHLORIDE SCH MG: 10 CAPSULE ORAL at 21:29

## 2020-06-27 RX ADMIN — ACETAMINOPHEN SCH: 325 TABLET ORAL at 21:32

## 2020-06-27 RX ADMIN — HYDROCHLOROTHIAZIDE SCH MG: 25 TABLET ORAL at 09:16

## 2020-06-27 RX ADMIN — FOLIC ACID SCH MG: 1 TABLET ORAL at 09:16

## 2020-06-27 RX ADMIN — SODIUM CHLORIDE, POTASSIUM CHLORIDE, SODIUM LACTATE AND CALCIUM CHLORIDE SCH: 600; 310; 30; 20 INJECTION, SOLUTION INTRAVENOUS at 16:30

## 2020-06-27 RX ADMIN — MULTIVITAMIN TABLET SCH TAB: TABLET at 09:19

## 2020-06-27 RX ADMIN — OXYCODONE HYDROCHLORIDE SCH MG: 10 TABLET, FILM COATED, EXTENDED RELEASE ORAL at 09:17

## 2020-06-27 RX ADMIN — ACETAMINOPHEN SCH MG: 325 TABLET ORAL at 04:50

## 2020-06-27 RX ADMIN — ACETAMINOPHEN SCH MG: 325 TABLET ORAL at 10:39

## 2020-06-27 RX ADMIN — OXYCODONE HYDROCHLORIDE SCH MG: 10 TABLET, FILM COATED, EXTENDED RELEASE ORAL at 21:31

## 2020-06-27 RX ADMIN — CHLORHEXIDINE GLUCONATE SCH APPLIC: 213 SOLUTION TOPICAL at 21:29

## 2020-06-27 RX ADMIN — ACETAMINOPHEN SCH MG: 325 TABLET ORAL at 16:24

## 2020-06-27 RX ADMIN — POLYETHYLENE GLYCOL 3350 SCH GM: 17 POWDER, FOR SOLUTION ORAL at 12:24

## 2020-06-27 RX ADMIN — TAMSULOSIN HYDROCHLORIDE SCH MG: 0.4 CAPSULE ORAL at 09:15

## 2020-06-27 RX ADMIN — ATORVASTATIN CALCIUM SCH MG: 40 TABLET, FILM COATED ORAL at 21:30

## 2020-06-27 RX ADMIN — HEPARIN SODIUM SCH UNIT: 5000 INJECTION, SOLUTION INTRAVENOUS; SUBCUTANEOUS at 02:48

## 2020-06-27 RX ADMIN — PANTOPRAZOLE SODIUM SCH MG: 20 TABLET, DELAYED RELEASE ORAL at 09:19

## 2020-06-27 RX ADMIN — MUPIROCIN SCH APPLIC: 20 OINTMENT TOPICAL at 21:29

## 2020-06-27 RX ADMIN — GABAPENTIN SCH MG: 100 CAPSULE ORAL at 09:17

## 2020-06-27 RX ADMIN — HEPARIN SODIUM SCH UNIT: 5000 INJECTION, SOLUTION INTRAVENOUS; SUBCUTANEOUS at 09:16

## 2020-06-27 RX ADMIN — CEFAZOLIN SCH MLS/HR: 1 INJECTION, POWDER, FOR SOLUTION INTRAVENOUS at 05:45

## 2020-06-27 RX ADMIN — DOCUSATE SODIUM SCH MG: 100 CAPSULE, LIQUID FILLED ORAL at 05:45

## 2020-06-27 RX ADMIN — MUPIROCIN SCH APPLIC: 20 OINTMENT TOPICAL at 09:15

## 2020-06-27 RX ADMIN — SODIUM CHLORIDE, POTASSIUM CHLORIDE, SODIUM LACTATE AND CALCIUM CHLORIDE SCH MLS/HR: 600; 310; 30; 20 INJECTION, SOLUTION INTRAVENOUS at 02:49

## 2020-06-27 RX ADMIN — GABAPENTIN SCH MG: 100 CAPSULE ORAL at 21:31

## 2020-06-27 NOTE — PN
Progress Note (short form)





- Note


Progress Note: 


PULM/CCM





POD#2 s/p Laminectomy





Patient seen and examined in the ICU. Extubated --> ORA. CA+OX3. Eating.





Active Medications





Acetaminophen (Tylenol -)  650 mg PO Q6H [facility]


   Last Admin: 06/27/20 10:39 Dose:  650 mg


   Documented by: 


Ascorbic Acid (Vitamin C -)  500 mg PO BID [facility]


   Last Admin: 06/27/20 09:19 Dose:  500 mg


   Documented by: 


Atorvastatin Calcium (Lipitor -)  40 mg PO [facility]


   Last Admin: 06/26/20 22:04 Dose:  40 mg


   Documented by: 


Chlorhexidine Gluconate (Hibiclens For Decolonization -)  1 applic TP [facility]


   Last Admin: 06/26/20 22:02 Dose:  1 applic


   Documented by: 


Clonazepam (Klonopin -)  1 mg PO BID [facility]


   Last Admin: 06/27/20 09:16 Dose:  1 mg


   Documented by: 


Dicyclomine HCl (Bentyl -)  10 mg PO BID [facility]


   Last Admin: 06/27/20 09:15 Dose:  10 mg


   Documented by: 


Diphenhydramine HCl (Benadryl Injection -)  25 mg IVPUSH ONCE PRN


   PRN Reason: FOR ITCHING


Diphenhydramine HCl (Benadryl -)  25 mg PO Q6H PRN


   PRN Reason: FOR ITCHING


Docusate Sodium (Colace -)  100 mg PO TID [facility]


   Last Admin: 06/27/20 13:51 Dose:  100 mg


   Documented by: 


Ferrous Sulfate (Feosol -)  325 mg PO DAILY@0800 [facility]


   Last Admin: 06/27/20 09:00 Dose:  325 mg


   Documented by: 


Folic Acid (Folic Acid -)  1 mg PO DAILY [facility]


   Last Admin: 06/27/20 09:16 Dose:  1 mg


   Documented by: 


Gabapentin (Neurontin -)  200 mg PO BID [facility]


   Last Admin: 06/27/20 09:17 Dose:  200 mg


   Documented by: 


Heparin Sodium (Porcine) (Heparin -)  5,000 unit SQ Q8H [facility]


   Last Admin: 06/27/20 09:16 Dose:  5,000 unit


   Documented by: 


Hydrochlorothiazide (Hctz -)  25 mg PO DAILY [facility]


   Last Admin: 06/27/20 09:16 Dose:  25 mg


   Documented by: 


Lactated Ringer's (Lactated Ringers Solution)  1,000 ml in 1,000 mls @ 75 mls/hr

IV ASDIR [facility]


   Last Admin: 06/27/20 02:49 Dose:  75 mls/hr


   Documented by: 


Cefazolin Sodium 1 gm/ (Dextrose)  50 mls @ 100 mls/hr IVPB TID [facility]


   Last Admin: 06/27/20 13:51 Dose:  100 mls/hr


   Documented by: 


Multivitamins/Minerals/Vitamin C (Tab-A-Vit -)  1 tab PO DAILY [facility]


   Last Admin: 06/27/20 09:19 Dose:  1 tab


   Documented by: 


Mupirocin (Bactroban Ointment (For Decolonization) -)  1 applic NS BID [facility]


   Stop: 06/30/20 21:59


   Last Admin: 06/27/20 09:15 Dose:  1 applic


   Documented by: 


Naloxone HCl (Narcan -)  0.4 mg IVPUSH ONCE PRN


   PRN Reason: Sedation


Ondansetron HCl (Zofran Injection)  4 mg IVPUSH Q6H PRN


   PRN Reason: NAUSEA


Oxycodone HCl (Oxycontin -)  10 mg PO BID [facility]


   Last Admin: 06/27/20 09:17 Dose:  10 mg


   Documented by: 


Pantoprazole Sodium (Protonix -)  20 mg PO DAILY [facility]


   Last Admin: 06/27/20 09:19 Dose:  20 mg


   Documented by: 


Polyethylene Glycol (Miralax (For Daily Use) -)  17 gm PO DAILY [facility]


   Last Admin: 06/27/20 12:24 Dose:  17 gm


   Documented by: 


Tamsulosin HCl (Flomax -)  0.4 mg PO 0830 [facility]


   Last Admin: 06/27/20 09:15 Dose:  0.4 mg


   Documented by: 


Venlafaxine HCl (Effexor Xr -)  75 mg PO BID [facility]


   Last Admin: 06/27/20 09:16 Dose:  75 mg


   Documented by: 





                                   Vital Signs











 Period  Temp  Pulse  Resp  BP Sys/Beard  Pulse Ox


 


 Last 24 Hr  98.0 F-98.7 F    19-94  121-137/  96-98








                                 Intake & Output











 06/24/20 06/25/20 06/26/20 06/27/20





 23:59 23:59 23:59 23:59


 


Intake Total  [id number]


 


Output Total  2005 2132 5147


 


Balance  3252 113 -239


 


Weight 81.647 kg  81.647 kg 








GEN: Elderly man CA+OX3, NAD


HEENT: NCAT, PERRL, an-icteric, MMM


PULM: CTAB


CV: nml S1 S2, RR, Unable to appreciate any G/M/R


ABD: Hypo-active BS, S/S N/T N/D X4Q


EXT: + Pulses, WWPX4, (-) edema


SKIN:Warm/dry no obvious rashes lesions or ulcers








                                    CBC, BMP





                                 06/27/20 05:25 





                                 06/27/20 05:25 

















ASSESS:


POD #2: S/P T12 to S2 laminectomies, decompression, and fusion 


Resolving Post-Op Respiratory Insufficiency 


HTN 


HPL 


History of PACs & PVCs & SVT S/P ablation 2017





PLAN:


Pain control


OOB with PT 


OOB with TLSO brace


Monitor drain 


PPI 


Advance diet a/p neurosx


Normal transfusion thresholds 


Follow Neuro exam


VTE prophylaxis


F/u w/ Dr Gray


D/c --> Med Surg 





FAUSTO LEÓN-BC


[facility] ICU


PULM/CCM


[id number]

## 2020-06-27 NOTE — PN
Progress Note (short form)





- Note


Progress Note: 





POD#2 Laminectomy


slightly confused -- he received Oxycontin few minutes ago 


no bm 


                               Vital Signs - 24 hr











  06/26/20 06/26/20 06/26/20





  14:00 15:27 16:00


 


Temperature 98.2 F  98.3 F


 


Pulse Rate 97 H 92 H 98 H


 


Respiratory 26 H  24 H





Rate   


 


Blood Pressure 137/61  137/61


 


O2 Sat by Pulse  98 





Oximetry (%)   














  06/26/20 06/26/20 06/26/20





  18:00 20:00 21:00


 


Temperature 98.2 F 98.6 F 


 


Pulse Rate 86 86 


 


Respiratory 24 H 25 H 





Rate   


 


Blood Pressure 131/57 L 128/56 L 


 


O2 Sat by Pulse   96





Oximetry (%)   














  06/26/20 06/26/20 06/27/20





  21:32 22:00 00:00


 


Temperature  98.6 F 98.7 F


 


Pulse Rate  113 H 94 H


 


Respiratory  30 H 94 H





Rate   


 


Blood Pressure  126/104 H 134/72


 


O2 Sat by Pulse 96  





Oximetry (%)   














  06/27/20 06/27/20 06/27/20





  02:00 04:00 06:00


 


Temperature 98.7 F 98.7 F 98.4 F


 


Pulse Rate 85 95 H 88


 


Respiratory 26 H 24 H 20





Rate   


 


Blood Pressure 134/58 L 127/57 L 124/66


 


O2 Sat by Pulse   





Oximetry (%)   














  06/27/20 06/27/20





  08:00 10:00


 


Temperature 98.4 F 98.3 F


 


Pulse Rate 91 H 82


 


Respiratory 19 19





Rate  


 


Blood Pressure 125/71 121/69


 


O2 Sat by Pulse 98 





Oximetry (%)  








                               Current Medications











Generic Name Dose Route Start Last Admin





  Trade Name Freq  PRN Reason Stop Dose Admin


 


Acetaminophen  650 mg  06/25/20 16:15  06/27/20 10:39





  Tylenol -  PO   650 mg





  Q6H ANTELMO   Administration


 


Ascorbic Acid  500 mg  06/25/20 22:00  06/27/20 09:19





  Vitamin C -  PO   500 mg





  BID ANTELMO   Administration


 


Atorvastatin Calcium  40 mg  06/25/20 22:00  06/26/20 22:04





  Lipitor -  PO   40 mg





  HS ANTELMO   Administration


 


Chlorhexidine Gluconate  1 applic  06/25/20 22:00  06/26/20 22:02





  Hibiclens For Decolonization -  TP   1 applic





  HS ANTELMO   Administration


 


Clonazepam  1 mg  06/25/20 22:00  06/27/20 09:16





  Klonopin -  PO   1 mg





  BID ANTELMO   Administration


 


Clonazepam  1 mg  06/26/20 15:06 





  Klonopin -  PO  06/27/20 15:05 





  ONCE PRN  





  ANXIETY  


 


Dicyclomine HCl  10 mg  06/25/20 22:00  06/27/20 09:15





  Bentyl -  PO   10 mg





  BID ANTELMO   Administration


 


Diphenhydramine HCl  25 mg  06/25/20 16:15 





  Benadryl Injection -  IVPUSH  





  ONCE PRN  





  FOR ITCHING  


 


Diphenhydramine HCl  25 mg  06/25/20 16:24 





  Benadryl -  PO  





  Q6H PRN  





  FOR ITCHING  


 


Docusate Sodium  100 mg  06/25/20 22:00  06/27/20 05:45





  Colace -  PO   100 mg





  TID ANTELMO   Administration


 


Ferrous Sulfate  325 mg  06/26/20 08:00  06/27/20 09:00





  Feosol -  PO   325 mg





  DAILY@0800 ANTELMO   Administration


 


Folic Acid  1 mg  06/26/20 10:00  06/27/20 09:16





  Folic Acid -  PO   1 mg





  DAILY ANTELMO   Administration


 


Gabapentin  200 mg  06/25/20 22:00  06/27/20 09:17





  Neurontin -  PO   200 mg





  BID ANTELMO   Administration


 


Heparin Sodium (Porcine)  5,000 unit  06/26/20 10:00  06/27/20 09:16





  Heparin -  SQ   5,000 unit





  Q8H ANTELMO   Administration


 


Hydrochlorothiazide  25 mg  06/26/20 10:00  06/27/20 09:16





  Hctz -  PO   25 mg





  DAILY ANTELMO   Administration


 


Lactated Ringer's  1,000 ml in 1,000 mls @ 75 mls/hr  06/25/20 16:30  06/27/20 

02:49





  Lactated Ringers Solution  IV   75 mls/hr





  ASDIR ANTELMO   Administration


 


Cefazolin Sodium 1 gm/  50 mls @ 100 mls/hr  06/25/20 22:00  06/27/20 05:45





  Dextrose  IVPB   100 mls/hr





  TID ANTELMO   Administration


 


Multivitamins/Minerals/Vitamin C  1 tab  06/26/20 10:00  06/27/20 09:19





  Tab-A-Vit -  PO   1 tab





  DAILY ANTELMO   Administration


 


Mupirocin  1 applic  06/25/20 22:00  06/27/20 09:15





  Bactroban Ointment (For Decolonization) -  NS  06/30/20 21:59  1 applic





  BID ANTELMO   Administration


 


Naloxone HCl  0.4 mg  06/25/20 16:15 





  Narcan -  IVPUSH  





  ONCE PRN  





  Sedation  


 


Ondansetron HCl  4 mg  06/25/20 16:24 





  Zofran Injection  IVPUSH  





  Q6H PRN  





  NAUSEA  


 


Oxycodone HCl  10 mg  06/26/20 16:17  06/27/20 09:17





  Oxycontin -  PO   10 mg





  BID ANTELMO   Administration


 


Pantoprazole Sodium  20 mg  06/26/20 10:00  06/27/20 09:19





  Protonix -  PO   20 mg





  DAILY ANTELMO   Administration


 


Polyethylene Glycol  17 gm  06/27/20 12:15 





  Miralax (For Daily Use) -  PO  





  DAILY ANTELMO  


 


Tamsulosin HCl  0.4 mg  06/26/20 08:30  06/27/20 09:15





  Flomax -  PO   0.4 mg





  0830 ANTELMO   Administration


 


Venlafaxine HCl  75 mg  06/25/20 22:00  06/27/20 09:16





  Effexor Xr -  PO   75 mg





  BID ANTELMO   Administration








                         Laboratory Results - last 24 hr











  06/27/20 06/27/20





  05:25 05:25


 


WBC  11.2 H 


 


RBC  2.82 L 


 


Hgb  8.3 L 


 


Hct  24.7 L D 


 


MCV  87.5 


 


MCH  29.3 


 


MCHC  33.5 


 


RDW  14.6 


 


Plt Count  114 L D 


 


MPV  9.0 


 


Sodium   141


 


Potassium   3.4 L


 


Chloride   107


 


Carbon Dioxide   31


 


Anion Gap   4 L


 


BUN   14.5


 


Creatinine   0.7


 


Est GFR (CKD-EPI)AfAm   109.27


 


Est GFR (CKD-EPI)NonAf   94.28


 


Random Glucose   105


 


Calcium   7.6 L


 


Phosphorus   2.0 L


 


Magnesium   2.0


 


Total Bilirubin   0.6


 


AST   29


 


ALT   22


 


Alkaline Phosphatase   69


 


Total Protein   4.8 L


 


Albumin   2.4 L








S1 S2 RRR


Lungs decreased


Abd- soft, obese, NT


no edema


King+





A/P


Problem List





- Problems


(1) S/P laminectomy


Assessment/Plan: 


icu


extubated 


pain control- may give tylenol in between 


Pt eval


Back brace 








Problems reviewed: Yes   


Code(s): Z98.890 - OTHER SPECIFIED POSTPROCEDURAL STATES   





(2) Hepatitis C


Assessment/Plan: 


s/p treatment


Problems reviewed: Yes   


Code(s): B19.20 - UNSPECIFIED VIRAL HEPATITIS C WITHOUT HEPATIC COMA   





(3) History of cholecystectomy


Assessment/Plan: 


no active issues


Problems reviewed: Yes   


Code(s): Z90.49 - ACQUIRED ABSENCE OF OTHER SPECIFIED PARTS OF DIGESTIVE TRACT  







(4) History of penile implant


Assessment/Plan: 


No active issues


Problems reviewed: Yes   


Code(s): Z96.0 - PRESENCE OF UROGENITAL IMPLANTS   





(5) History of cardiac radiofrequency ablation


Assessment/Plan: 


cardiology following


Code(s): Z98.890 - OTHER SPECIFIED POSTPROCEDURAL STATES   





(6) Hypertension


Assessment/Plan: 


on meds


Problems reviewed: Yes   


Code(s): I10 - ESSENTIAL (PRIMARY) HYPERTENSION   





(7) Generalized anxiety disorder


Problems reviewed: Yes   


Code(s): F41.1 - GENERALIZED ANXIETY DISORDER   





(8) PTSD (post-traumatic stress disorder)


Problems reviewed: Yes   


Code(s): F43.10 - POST-TRAUMATIC STRESS DISORDER, UNSPECIFIED   








Anemia-->monitor , no active bleeding 


hypokalemia-- replace lytes








Problem List





- Problems


(1) Hepatitis C


Code(s): B19.20 - UNSPECIFIED VIRAL HEPATITIS C WITHOUT HEPATIC COMA   





(2) S/P laminectomy


Code(s): Z98.890 - OTHER SPECIFIED POSTPROCEDURAL STATES   





(3) History of back surgery


Code(s): Z98.890 - OTHER SPECIFIED POSTPROCEDURAL STATES   





(4) Hypercholesterolemia


Code(s): E78.00 - PURE HYPERCHOLESTEROLEMIA, UNSPECIFIED   





(5) Hypertension


Code(s): I10 - ESSENTIAL (PRIMARY) HYPERTENSION   





(6) PTSD (post-traumatic stress disorder)


Code(s): F43.10 - POST-TRAUMATIC STRESS DISORDER, UNSPECIFIED

## 2020-06-27 NOTE — PN
Progress Note, Physician


Chief Complaint: 





atrial arrhythmia


History of Present Illness: 





alert


denies sob, cp, swelling, palp





- Current Medication List


Current Medications: 


Active Medications





Acetaminophen (Tylenol -)  650 mg PO Q6H Count includes the [facility]


   Last Admin: 06/27/20 04:50 Dose:  650 mg


   Documented by: 


Ascorbic Acid (Vitamin C -)  500 mg PO BID Count includes the [facility]


   Last Admin: 06/27/20 09:19 Dose:  500 mg


   Documented by: 


Atorvastatin Calcium (Lipitor -)  40 mg PO [facility]


   Last Admin: 06/26/20 22:04 Dose:  40 mg


   Documented by: 


Chlorhexidine Gluconate (Hibiclens For Decolonization -)  1 applic TP [facility]


   Last Admin: 06/26/20 22:02 Dose:  1 applic


   Documented by: 


Clonazepam (Klonopin -)  1 mg PO BID Count includes the [facility]


   Last Admin: 06/27/20 09:16 Dose:  1 mg


   Documented by: 


Clonazepam (Klonopin -)  1 mg PO ONCE PRN


   PRN Reason: ANXIETY


   Stop: 06/27/20 15:05


Dicyclomine HCl (Bentyl -)  10 mg PO BID Count includes the [facility]


   Last Admin: 06/27/20 09:15 Dose:  10 mg


   Documented by: 


Diphenhydramine HCl (Benadryl Injection -)  25 mg IVPUSH ONCE PRN


   PRN Reason: FOR ITCHING


Diphenhydramine HCl (Benadryl -)  25 mg PO Q6H PRN


   PRN Reason: FOR ITCHING


Docusate Sodium (Colace -)  100 mg PO TID Count includes the [facility]


   Last Admin: 06/27/20 05:45 Dose:  100 mg


   Documented by: 


Ferrous Sulfate (Feosol -)  325 mg PO DAILY@0800 Count includes the [facility]


   Last Admin: 06/27/20 09:00 Dose:  325 mg


   Documented by: 


Folic Acid (Folic Acid -)  1 mg PO DAILY Count includes the [facility]


   Last Admin: 06/27/20 09:16 Dose:  1 mg


   Documented by: 


Gabapentin (Neurontin -)  200 mg PO BID Count includes the [facility]


   Last Admin: 06/27/20 09:17 Dose:  200 mg


   Documented by: 


Heparin Sodium (Porcine) (Heparin -)  5,000 unit SQ Q8H Count includes the [facility]


   Last Admin: 06/27/20 09:16 Dose:  5,000 unit


   Documented by: 


Hydrochlorothiazide (Hctz -)  25 mg PO DAILY Count includes the [facility]


   Last Admin: 06/27/20 09:16 Dose:  25 mg


   Documented by: 


Lactated Ringer's (Lactated Ringers Solution)  1,000 ml in 1,000 mls @ 75 mls/hr

IV ASDIR Count includes the [facility]


   Last Admin: 06/27/20 02:49 Dose:  75 mls/hr


   Documented by: 


Cefazolin Sodium 1 gm/ (Dextrose)  50 mls @ 100 mls/hr IVPB TID Count includes the [facility]


   Last Admin: 06/27/20 05:45 Dose:  100 mls/hr


   Documented by: 


Multivitamins/Minerals/Vitamin C (Tab-A-Vit -)  1 tab PO DAILY Count includes the [facility]


   Last Admin: 06/27/20 09:19 Dose:  1 tab


   Documented by: 


Mupirocin (Bactroban Ointment (For Decolonization) -)  1 applic NS BID Count includes the [facility]


   Stop: 06/30/20 21:59


   Last Admin: 06/27/20 09:15 Dose:  1 applic


   Documented by: 


Naloxone HCl (Narcan -)  0.4 mg IVPUSH ONCE PRN


   PRN Reason: Sedation


Ondansetron HCl (Zofran Injection)  4 mg IVPUSH Q6H PRN


   PRN Reason: NAUSEA


Oxycodone HCl (Oxycontin -)  10 mg PO BID Count includes the [facility]


   Last Admin: 06/27/20 09:17 Dose:  10 mg


   Documented by: 


Pantoprazole Sodium (Protonix -)  20 mg PO DAILY Count includes the [facility]


   Last Admin: 06/27/20 09:19 Dose:  20 mg


   Documented by: 


Tamsulosin HCl (Flomax -)  0.4 mg PO 0830 Count includes the [facility]


   Last Admin: 06/27/20 09:15 Dose:  0.4 mg


   Documented by: 


Venlafaxine HCl (Effexor Xr -)  75 mg PO BID Count includes the [facility]


   Last Admin: 06/27/20 09:16 Dose:  75 mg


   Documented by: 











- Objective


Vital Signs: 


                                   Vital Signs











Temperature  98.3 F   06/27/20 10:00


 


Pulse Rate  82   06/27/20 10:00


 


Respiratory Rate  19   06/27/20 10:00


 


Blood Pressure  121/69   06/27/20 10:00


 


O2 Sat by Pulse Oximetry (%)  98   06/27/20 08:00











Constitutional: Yes: Well Nourished, No Distress, Calm


Cardiovascular: Yes: Regular Rate and Rhythm.  No: Gallop


Respiratory: Yes: Regular, CTA Bilaterally.  No: Accessory Muscle Use


Extremities: No: Cold


Edema: No (SCDs)


Neurological: Yes: Alert.  No: Seizure


Psychiatric: No: Agitated


Labs: 


                                    CBC, BMP





                                 06/27/20 05:25 





                                 06/27/20 05:25 











Assessment/Plan


tele: NSR with APCs, sinus tach, brief PSVT, 3b NSVT











a/p:  74 m hx htn, hld, hx pacs and pvcs, svt s/p ablation 2017, s/p elective 

spine surgery.





sinus with frequent APCs, PSVT, nonsustained VT:


-tele irregular at times with APCs, no definite AF seen thus far. ECG confirmed 

sinus with APCs


-cont tele monitoring


-replete K





htn:


-bp stable


-cont current meds





hld:


-cont home med





svt s/p ablation:


-has been in sr here, one brief run PSVT on tele


-cont tele monitoring





s/p laminectomy:


-Post op management as per Critical Care an Neurosurgery

## 2020-06-28 LAB
ALBUMIN SERPL-MCNC: 2.2 G/DL (ref 3.4–5)
ALP SERPL-CCNC: 81 U/L (ref 45–117)
ALT SERPL-CCNC: 20 U/L (ref 13–61)
ANION GAP SERPL CALC-SCNC: 3 MMOL/L (ref 8–16)
AST SERPL-CCNC: 25 U/L (ref 15–37)
BASOPHILS # BLD: 0.2 % (ref 0–2)
BILIRUB SERPL-MCNC: 0.8 MG/DL (ref 0.2–1)
BUN SERPL-MCNC: 9.8 MG/DL (ref 7–18)
CALCIUM SERPL-MCNC: 7.6 MG/DL (ref 8.5–10.1)
CHLORIDE SERPL-SCNC: 104 MMOL/L (ref 98–107)
CO2 SERPL-SCNC: 35 MMOL/L (ref 21–32)
CREAT SERPL-MCNC: 0.7 MG/DL (ref 0.55–1.3)
DEPRECATED RDW RBC AUTO: 14.6 % (ref 11.9–15.9)
EOSINOPHIL # BLD: 0.8 % (ref 0–4.5)
GLUCOSE SERPL-MCNC: 99 MG/DL (ref 74–106)
HCT VFR BLD CALC: 22 % (ref 35.4–49)
HGB BLD-MCNC: 7.4 GM/DL (ref 11.7–16.9)
LYMPHOCYTES # BLD: 19.1 % (ref 8–40)
MAGNESIUM SERPL-MCNC: 2 MG/DL (ref 1.8–2.4)
MCH RBC QN AUTO: 29.4 PG (ref 25.7–33.7)
MCHC RBC AUTO-ENTMCNC: 33.7 G/DL (ref 32–35.9)
MCV RBC: 87.3 FL (ref 80–96)
MONOCYTES # BLD AUTO: 6.3 % (ref 3.8–10.2)
NEUTROPHILS # BLD: 73.6 % (ref 42.8–82.8)
PHOSPHATE SERPL-MCNC: 2 MG/DL (ref 2.5–4.9)
PLATELET # BLD AUTO: 110 K/MM3 (ref 134–434)
PMV BLD: 9.3 FL (ref 7.5–11.1)
POTASSIUM SERPLBLD-SCNC: 3 MMOL/L (ref 3.5–5.1)
PROT SERPL-MCNC: 4.7 G/DL (ref 6.4–8.2)
RBC # BLD AUTO: 2.52 M/MM3 (ref 4–5.6)
SODIUM SERPL-SCNC: 141 MMOL/L (ref 136–145)
WBC # BLD AUTO: 8 K/MM3 (ref 4–10)

## 2020-06-28 RX ADMIN — ACETAMINOPHEN SCH MG: 325 TABLET ORAL at 04:50

## 2020-06-28 RX ADMIN — POLYETHYLENE GLYCOL 3350 SCH GM: 17 POWDER, FOR SOLUTION ORAL at 10:09

## 2020-06-28 RX ADMIN — ATORVASTATIN CALCIUM SCH MG: 40 TABLET, FILM COATED ORAL at 21:32

## 2020-06-28 RX ADMIN — GABAPENTIN SCH MG: 100 CAPSULE ORAL at 10:10

## 2020-06-28 RX ADMIN — MULTIVITAMIN TABLET SCH TAB: TABLET at 10:10

## 2020-06-28 RX ADMIN — VENLAFAXINE HYDROCHLORIDE SCH MG: 75 CAPSULE, EXTENDED RELEASE ORAL at 10:08

## 2020-06-28 RX ADMIN — POTASSIUM CHLORIDE SCH MLS/HR: 7.46 INJECTION, SOLUTION INTRAVENOUS at 08:20

## 2020-06-28 RX ADMIN — CHLORHEXIDINE GLUCONATE SCH APPLIC: 213 SOLUTION TOPICAL at 22:08

## 2020-06-28 RX ADMIN — OXYCODONE HYDROCHLORIDE AND ACETAMINOPHEN SCH MG: 500 TABLET ORAL at 21:31

## 2020-06-28 RX ADMIN — VENLAFAXINE HYDROCHLORIDE SCH MG: 75 CAPSULE, EXTENDED RELEASE ORAL at 21:31

## 2020-06-28 RX ADMIN — HYDROCHLOROTHIAZIDE SCH: 25 TABLET ORAL at 10:02

## 2020-06-28 RX ADMIN — OXYCODONE HYDROCHLORIDE SCH: 10 TABLET, FILM COATED, EXTENDED RELEASE ORAL at 10:01

## 2020-06-28 RX ADMIN — MUPIROCIN SCH APPLIC: 20 OINTMENT TOPICAL at 22:08

## 2020-06-28 RX ADMIN — PANTOPRAZOLE SODIUM SCH MG: 20 TABLET, DELAYED RELEASE ORAL at 10:10

## 2020-06-28 RX ADMIN — MUPIROCIN SCH APPLIC: 20 OINTMENT TOPICAL at 10:07

## 2020-06-28 RX ADMIN — TAMSULOSIN HYDROCHLORIDE SCH MG: 0.4 CAPSULE ORAL at 08:20

## 2020-06-28 RX ADMIN — POTASSIUM CHLORIDE SCH MLS/HR: 7.46 INJECTION, SOLUTION INTRAVENOUS at 09:58

## 2020-06-28 RX ADMIN — POTASSIUM & SODIUM PHOSPHATES POWDER PACK 280-160-250 MG SCH PACKET: 280-160-250 PACK at 21:31

## 2020-06-28 RX ADMIN — POTASSIUM & SODIUM PHOSPHATES POWDER PACK 280-160-250 MG SCH PACKET: 280-160-250 PACK at 10:12

## 2020-06-28 RX ADMIN — OXYCODONE HYDROCHLORIDE SCH MG: 10 TABLET, FILM COATED, EXTENDED RELEASE ORAL at 21:31

## 2020-06-28 RX ADMIN — OXYCODONE HYDROCHLORIDE AND ACETAMINOPHEN SCH MG: 500 TABLET ORAL at 10:12

## 2020-06-28 RX ADMIN — DICYCLOMINE HYDROCHLORIDE SCH: 10 CAPSULE ORAL at 10:03

## 2020-06-28 NOTE — PN
Progress Note (short form)





- Note


Progress Note: 





PULM/CCM





POD#3 s/p Laminectomy





Patient seen and examined in the ICU.  Alert and oriented x3, confused at times





                                   Vital Signs











 Period  Temp  Pulse  Resp  BP Sys/Beard  Pulse Ox


 


 Last 24 Hr  97.9 F-98.6 F  80-98  20-25  105-152/47-99  98-98











                                 Intake & Output











 06/25/20 06/26/20 06/27/20 06/28/20





 23:59 23:59 23:59 23:59


 


Intake Total 5257 2245 2295 454


 


Output Total 2005 2132 2603 933


 


Balance 3781 195 -309 -266


 


Weight  81.647 kg  











GEN: Elderly man CA+OX3, NAD


HEENT: NCAT, PERRL, an-icteric, MMM


PULM: CTAB


CV: nml S1 S2, RR, Unable to appreciate any G/M/R


ABD: Hypo-active BS, S/S N/T N/D X4Q


EXT: + Pulses, WWPX4, (-) edema


SKIN:Warm/dry no obvious rashes lesions or ulcers





                                    CBC, BMP





                                 06/28/20 05:25 





                                 06/28/20 05:25 

















ASSESS:


POD #2: S/P T12 to S2 laminectomies, decompression, and fusion 


Resolving Post-Op Respiratory Insufficiency 


HTN 


HPL 


History of PACs & PVCs & SVT S/P ablation 2017


Confusion





PLAN:


- Pain control


- Will hold on Oxycontin at this time due to periods of confusion, give Tylenol 

IV 1 g q 6 hours for pain  


- OOB with PT 


- OOB with TLSO brace


- Monitor drain 


- PPI 


- One unit PRBC today for Hgb of 7.4


- Follow Neuro exam


- VTE prophylaxis


- Patient had episodes of confusion and reported double vision today, neuros

urgery aware


- Neuro exam WNL











Merlene Howe, [facility] ICU


PULM/CCM


[id number]

## 2020-06-28 NOTE — PN
Progress Note, Physician


Chief Complaint: 





atrial arrhythmia


History of Present Illness: 





denies sob, cp, palp, dizzy





- Current Medication List


Current Medications: 


Active Medications





Acetaminophen (Ofirmev Injection -)  1,000 mg IVPB Q6H PRN


   PRN Reason: PAIN LEVEL 4 - 6


   Stop: 06/29/20 09:47


Ascorbic Acid (Vitamin C -)  500 mg PO BID [facility]


   Last Admin: 06/27/20 21:32 Dose:  500 mg


   Documented by: 


Atorvastatin Calcium (Lipitor -)  40 mg PO [facility]


   Last Admin: 06/27/20 21:30 Dose:  40 mg


   Documented by: 


Chlorhexidine Gluconate (Hibiclens For Decolonization -)  1 applic TP [facility]


   Last Admin: 06/27/20 21:29 Dose:  1 applic


   Documented by: 


Clonazepam (Klonopin -)  1 mg PO BID [facility]


   Last Admin: 06/27/20 21:30 Dose:  1 mg


   Documented by: 


Dicyclomine HCl (Bentyl -)  10 mg PO BID [facility]


   Last Admin: 06/27/20 21:29 Dose:  10 mg


   Documented by: 


Diphenhydramine HCl (Benadryl Injection -)  25 mg IVPUSH ONCE PRN


   PRN Reason: FOR ITCHING


Gabapentin (Neurontin -)  200 mg PO BID [facility]


   Last Admin: 06/27/20 21:31 Dose:  200 mg


   Documented by: 


Hydrochlorothiazide (Hctz -)  25 mg PO DAILY [facility]


   Last Admin: 06/27/20 09:16 Dose:  25 mg


   Documented by: 


Potassium Chloride (Potassium Chloride 10 Meq Premix Ivpb -)  10 meq in 100 mls 

@ 100 mls/hr IVPB Q60M [facility]


   Stop: 06/28/20 10:14


   Last Admin: 06/28/20 09:58 Dose:  100 mls/hr


   Documented by: 


Multivitamins/Minerals/Vitamin C (Tab-A-Vit -)  1 tab PO DAILY [facility]


   Last Admin: 06/27/20 09:19 Dose:  1 tab


   Documented by: 


Mupirocin (Bactroban Ointment (For Decolonization) -)  1 applic NS BID [facility]


   Stop: 06/30/20 21:59


   Last Admin: 06/27/20 21:29 Dose:  1 applic


   Documented by: 


Naloxone HCl (Narcan -)  0.4 mg IVPUSH ONCE PRN


   PRN Reason: Sedation


Oxycodone HCl (Oxycontin -)  10 mg PO BID [facility]


   Last Admin: 06/27/20 21:31 Dose:  10 mg


   Documented by: 


Pantoprazole Sodium (Protonix -)  20 mg PO DAILY [facility]


   Last Admin: 06/27/20 09:19 Dose:  20 mg


   Documented by: 


Polyethylene Glycol (Miralax (For Daily Use) -)  17 gm PO DAILY [facility]


   Last Admin: 06/27/20 12:24 Dose:  17 gm


   Documented by: 


Potassium Phos/Sodium Phos (Phos-Nak Packet -)  2 packet PO BID [facility]


   Stop: 06/28/20 22:00


Tamsulosin HCl (Flomax -)  0.4 mg PO 0830 [facility]


   Last Admin: 06/28/20 08:20 Dose:  0.4 mg


   Documented by: 


Venlafaxine HCl (Effexor Xr -)  75 mg PO BID [facility]


   Last Admin: 06/27/20 21:30 Dose:  75 mg


   Documented by: 











- Objective


Vital Signs: 


                                   Vital Signs











Temperature  98.2 F   06/28/20 09:00


 


Pulse Rate  80   06/28/20 09:00


 


Respiratory Rate  24 H  06/28/20 09:00


 


Blood Pressure  115/66   06/28/20 09:00


 


O2 Sat by Pulse Oximetry (%)  98   06/28/20 09:00











Constitutional: Yes: Well Nourished, No Distress, Calm


Cardiovascular: Yes: Regular Rate and Rhythm.  No: JVD, Gallop, Murmur


Respiratory: Yes: Regular, CTA Bilaterally.  No: Accessory Muscle Use


Extremities: No: Cold


Edema: No (SCDs)


Neurological: Yes: Alert.  No: Lethargy, Seizure


Psychiatric: No: Agitated


Labs: 


                                    CBC, BMP





                                 06/28/20 05:25 





                                 06/28/20 05:25 











Assessment/Plan


tele: NSR 











a/p:  74 m hx htn, hld, hx pacs and pvcs, svt s/p ablation 2017, s/p elective 

spine surgery.





sinus with frequent APCs, PSVT, nonsustained VT:


-tele irregular at times with APCs, no definite AF seen thus far. ECG confirmed 

sinus with APCs


-cont tele monitoring


-replete K aggressively (down further today)





htn:


-bp stable


-cont current meds





hld:


-cont home med





anemia:


-counts trending down


-per surgery, primary team





svt s/p ablation:


-has been in sr here, one brief run PSVT on tele


-cont tele monitoring





s/p laminectomy:


-Post op management as per Critical Care an Neurosurgery

## 2020-06-29 LAB
ALBUMIN SERPL-MCNC: 2.3 G/DL (ref 3.4–5)
ALP SERPL-CCNC: 117 U/L (ref 45–117)
ALT SERPL-CCNC: 24 U/L (ref 13–61)
ANION GAP SERPL CALC-SCNC: 4 MMOL/L (ref 8–16)
AST SERPL-CCNC: 23 U/L (ref 15–37)
BASOPHILS # BLD: 0.3 % (ref 0–2)
BILIRUB SERPL-MCNC: 0.4 MG/DL (ref 0.2–1)
BUN SERPL-MCNC: 11.6 MG/DL (ref 7–18)
CALCIUM SERPL-MCNC: 7.8 MG/DL (ref 8.5–10.1)
CHLORIDE SERPL-SCNC: 105 MMOL/L (ref 98–107)
CO2 SERPL-SCNC: 36 MMOL/L (ref 21–32)
CREAT SERPL-MCNC: 0.8 MG/DL (ref 0.55–1.3)
DEPRECATED RDW RBC AUTO: 14.3 % (ref 11.9–15.9)
EOSINOPHIL # BLD: 1.5 % (ref 0–4.5)
GLUCOSE SERPL-MCNC: 131 MG/DL (ref 74–106)
HCT VFR BLD CALC: 26.6 % (ref 35.4–49)
HGB BLD-MCNC: 9 GM/DL (ref 11.7–16.9)
LYMPHOCYTES # BLD: 15.5 % (ref 8–40)
MAGNESIUM SERPL-MCNC: 2.1 MG/DL (ref 1.8–2.4)
MCH RBC QN AUTO: 30.2 PG (ref 25.7–33.7)
MCHC RBC AUTO-ENTMCNC: 33.9 G/DL (ref 32–35.9)
MCV RBC: 88.9 FL (ref 80–96)
MONOCYTES # BLD AUTO: 6.3 % (ref 3.8–10.2)
NEUTROPHILS # BLD: 76.4 % (ref 42.8–82.8)
PHOSPHATE SERPL-MCNC: 2.4 MG/DL (ref 2.5–4.9)
PLATELET # BLD AUTO: 139 K/MM3 (ref 134–434)
PMV BLD: 9.1 FL (ref 7.5–11.1)
POTASSIUM SERPLBLD-SCNC: 3.5 MMOL/L (ref 3.5–5.1)
PROT SERPL-MCNC: 5.2 G/DL (ref 6.4–8.2)
RBC # BLD AUTO: 2.99 M/MM3 (ref 4–5.6)
SODIUM SERPL-SCNC: 145 MMOL/L (ref 136–145)
WBC # BLD AUTO: 7.4 K/MM3 (ref 4–10)

## 2020-06-29 RX ADMIN — POLYETHYLENE GLYCOL 3350 SCH GM: 17 POWDER, FOR SOLUTION ORAL at 09:23

## 2020-06-29 RX ADMIN — MUPIROCIN SCH APPLIC: 20 OINTMENT TOPICAL at 21:10

## 2020-06-29 RX ADMIN — VENLAFAXINE HYDROCHLORIDE SCH MG: 75 CAPSULE, EXTENDED RELEASE ORAL at 09:22

## 2020-06-29 RX ADMIN — ATORVASTATIN CALCIUM SCH MG: 40 TABLET, FILM COATED ORAL at 21:10

## 2020-06-29 RX ADMIN — ACETAMINOPHEN PRN MG: 325 TABLET ORAL at 19:16

## 2020-06-29 RX ADMIN — VENLAFAXINE HYDROCHLORIDE SCH MG: 75 CAPSULE, EXTENDED RELEASE ORAL at 21:10

## 2020-06-29 RX ADMIN — HYDROCHLOROTHIAZIDE SCH MG: 25 TABLET ORAL at 09:22

## 2020-06-29 RX ADMIN — OXYCODONE HYDROCHLORIDE AND ACETAMINOPHEN SCH MG: 500 TABLET ORAL at 09:22

## 2020-06-29 RX ADMIN — OXYCODONE HYDROCHLORIDE AND ACETAMINOPHEN SCH MG: 500 TABLET ORAL at 21:10

## 2020-06-29 RX ADMIN — TAMSULOSIN HYDROCHLORIDE SCH MG: 0.4 CAPSULE ORAL at 08:32

## 2020-06-29 RX ADMIN — OXYCODONE HYDROCHLORIDE SCH: 10 TABLET, FILM COATED, EXTENDED RELEASE ORAL at 09:24

## 2020-06-29 RX ADMIN — MULTIVITAMIN TABLET SCH TAB: TABLET at 09:21

## 2020-06-29 RX ADMIN — PANTOPRAZOLE SODIUM SCH MG: 20 TABLET, DELAYED RELEASE ORAL at 09:22

## 2020-06-29 RX ADMIN — MUPIROCIN SCH APPLIC: 20 OINTMENT TOPICAL at 09:33

## 2020-06-29 RX ADMIN — OXYCODONE HYDROCHLORIDE SCH: 10 TABLET, FILM COATED, EXTENDED RELEASE ORAL at 21:11

## 2020-06-29 RX ADMIN — CHLORHEXIDINE GLUCONATE SCH APPLIC: 213 SOLUTION TOPICAL at 21:10

## 2020-06-29 NOTE — PATH
Surgical Pathology Report



Patient Name:  HARLAN LIANG

Accession #:  [id number]

Med. Rec. #:  [id number]                                                        

   /Age/Gender:  1945 (Age: 74) / M

Account:  [id number]                                                          

             Location: ICU 

Taken:  2020

Received:  2020

Reported:  2020

Physicians:  Thad Kenny M.D.

  



Specimen(s) Received

 SPINAL CORD STIMULATOR (REMOVED) 





Clinical History

Lumbar spondylosis







Final Diagnosis

SPINAL CORD STIMULATOR, REMOVAL:

CONSISTENT WITH A STIMULATOR. GROSS EXAMINATION ONLY.





***Electronically Signed***

Danny Huynh M.D.





Gross Description

Received fresh labeled "spinal cord stimulator," is a 5.0 x 4.5 x 1.0 cm gray

metallic device, consistent with a battery. The specimen has the following

inscription: "[employer] MRI spinal cord stimulator

system REF: [id number] SN: [id number]." There is a 67 cm in length portion of wire

extending from one aspect of the specimen. No soft tissue is present. No

sections are submitted, gross only.

/2020



[place]/[email or web address]

## 2020-06-29 NOTE — PN
Teaching Attending Note


Name of Resident: Hernán Salmeron





ATTENDING PHYSICIAN STATEMENT





I saw and evaluated the patient.


I reviewed the resident's note and discussed the case with the resident.


I agree with the resident's findings and plan as documented.








SUBJECTIVE:


Patient seen and examined in the ICU. 


Awake and alert.  Apparently was mildly confused/agitated overnight. 


No CP or SOB. 


(+) Back pain : 7/10 











                                 Intake & Output











 06/26/20 06/27/20 06/28/20 06/29/20





 23:59 23:59 23:59 23:59


 


Intake Total 2245 2295 1584 300


 


Output Total 2132 2604 2310 510


 


Balance 113 -309 -726 -210


 


Weight 180 lb   





                                Last Vital Signs











Temp Pulse Resp BP Pulse Ox


 


 99.3 F   90   15   153/81   93 L


 


 06/29/20 10:00  06/29/20 12:00  06/29/20 12:00  06/29/20 12:00  06/29/20 09:00








Active Medications





Ascorbic Acid (Vitamin C -)  500 mg PO BID [facility]


   Last Admin: 06/29/20 09:22 Dose:  500 mg


   Documented by: 


Atorvastatin Calcium (Lipitor -)  40 mg PO [facility]


   Last Admin: 06/28/20 21:32 Dose:  40 mg


   Documented by: 


Chlorhexidine Gluconate (Hibiclens For Decolonization -)  1 applic TP [facility]


   Last Admin: 06/28/20 22:08 Dose:  1 applic


   Documented by: 


Clonazepam (Klonopin -)  1 mg PO BID PRN


   PRN Reason: ANXIETY


Diphenhydramine HCl (Benadryl Injection -)  25 mg IVPUSH ONCE PRN


   PRN Reason: FOR ITCHING


Hydrochlorothiazide (Hctz -)  25 mg PO DAILY [facility]


   Last Admin: 06/29/20 09:22 Dose:  25 mg


   Documented by: 


Multivitamins/Minerals/Vitamin C (Tab-A-Vit -)  1 tab PO DAILY [facility]


   Last Admin: 06/29/20 09:21 Dose:  1 tab


   Documented by: 


Mupirocin (Bactroban Ointment (For Decolonization) -)  1 applic NS BID [facility]


   Stop: 06/30/20 21:59


   Last Admin: 06/29/20 09:33 Dose:  1 applic


   Documented by: 


Naloxone HCl (Narcan -)  0.4 mg IVPUSH ONCE PRN


   PRN Reason: Sedation


Oxycodone HCl (Oxycontin -)  10 mg PO BID [facility]


   Last Admin: 06/29/20 09:24 Dose:  Not Given


   Documented by: 


Pantoprazole Sodium (Protonix -)  20 mg PO DAILY [facility]


   Last Admin: 06/29/20 09:22 Dose:  20 mg


   Documented by: 


Polyethylene Glycol (Miralax (For Daily Use) -)  17 gm PO DAILY [facility]


   Last Admin: 06/29/20 09:23 Dose:  17 gm


   Documented by: 


Tamsulosin HCl (Flomax -)  0.4 mg PO 0830 [facility]


   Last Admin: 06/29/20 08:32 Dose:  0.4 mg


   Documented by: 


Venlafaxine HCl (Effexor Xr -)  75 mg PO BID [facility]


   Last Admin: 06/29/20 09:22 Dose:  75 mg


   Documented by: 











GENERAL: Awake and alert 


HEAD: Normal with no signs of trauma.


EYES: (-) Pallor  


ENT: nares patent 


NECK: Trachea midline, full range of motion, supple. 


LUNGS: Clear to auscultation bilaterally, no wheezes, no crackles, no accessory 

muscle use. 


HEART: Regular rate and rhythm, S1, S2 without murmur, rub or gallop.


ABDOMEN: Soft, nontender, nondistended, normoactive bowel sounds, no guarding, 

no rebound, no hepatosplenomegaly, no masses.


EXTREMITIES: 2+ pulses, warm, well-perfused, no edema. 


NEUROLOGICAL: Non-focal 


SKIN: warm, dry, normal turgor, no rashes or lesions noted





                        Laboratory Results - last 24 hr











  06/25/20 06/29/20 06/29/20





  06:10 05:46 05:46


 


WBC   7.4 


 


RBC   2.99 L 


 


Hgb   9.0 L 


 


Hct   26.6 L D 


 


MCV   88.9 


 


MCH   30.2 


 


MCHC   33.9 


 


RDW   14.3 


 


Plt Count   139  D 


 


MPV   9.1 


 


Absolute Neuts (auto)   5.7 


 


Neutrophils %   76.4 


 


Lymphocytes %   15.5 


 


Monocytes %   6.3 


 


Eosinophils %   1.5  D 


 


Basophils %   0.3 


 


Nucleated RBC %   0 


 


Sodium    145


 


Potassium    3.5


 


Chloride    105


 


Carbon Dioxide    36 H


 


Anion Gap    4 L


 


BUN    11.6


 


Creatinine    0.8


 


Est GFR (CKD-EPI)AfAm    103.44


 


Est GFR (CKD-EPI)NonAf    89.25


 


Random Glucose    131 H


 


Calcium    7.8 L


 


Phosphorus    2.4 L


 


Magnesium    2.1


 


Total Bilirubin    0.4


 


AST    23


 


ALT    24


 


Alkaline Phosphatase    117


 


Total Protein    5.2 L


 


Albumin    2.3 L


 


Blood Type  B POSITIVE  


 


Antibody Screen  Negative  


 


Crossmatch  See Detail  











ASSESSMENT/PLAN:


Acute Respiratory Insufficiency 


POD #4: S/P T12 to S2 laminectomies, decompression, and fusion 


HTN 


HPL 


History of PACs & PVCs & SVT S/P ablation 2017





Pain control 


Incentive Spirometry 


VTE prophylaxis 


PPI 


Normal transfusion thresholds 


Follow Neuro exam 


PT 


Cardiac Telemetry monitoring 





Dr Chaves

## 2020-06-29 NOTE — PN
Progress Note (short form)





- Note


Progress Note: 


75yo M s/p T12-S2 PLIF.  Pt seen and examined in ICU.  Pt states he feels much 

better and wants to get out of bed and walk with PT.  Pt was give 1 unit pRBCs 

yesterday for anemia.  Pt states he feels much better today and no longer feels 

woozy or seeing double. Pt denies numbness or weakness in his extremities. 





                                  Vital Signs











Temp  99.3 F   06/29/20 10:00


 


Pulse  90   06/29/20 12:00


 


Resp  15   06/29/20 12:00


 


BP  153/81   06/29/20 12:00


 


Pulse Ox  93 L  06/29/20 09:00








                                 Intake & Output











 06/28/20 06/29/20 06/29/20





 23:59 11:59 23:59


 


Intake Total 1130 300 


 


Output Total 1380 510 


 


Balance -250 -210 


 


Intake:   


 


  IVPB 300  


 


  Oral 480 300 


 


  Packed Cells 350  


 


Output:   


 


  Drainage 110 110 


 


    Back 110 110 


 


  Urine 1270 400 


 


    King 1270 400 


 


Other:   


 


  Voiding Method Indwelling Catheter Indwelling Catheter 


 


  Bowel Movement No  








                                    CBC, BMP





                                 06/29/20 05:46 





                                 06/29/20 05:46 








PE:


Gen: A&O X3


Resp: breathing comfortably


Back: incision clean with no erythema or discharge, drainage serosanguinous


Ext: no weakness or numbness.








Problem List





- Problems


(1) S/P laminectomy


Assessment/Plan: 


Plan


   -pt cleared from surgical standpoint for transfer from ICU, will defer to 

Medicine/Cardiology for cardiac issues


   -OOB/ambulate with TLSO brace


   -regular diet


   -DVT ppx





Pt discussed with Dr. Gray, who agrees with plan. 


Code(s): Z98.890 - OTHER SPECIFIED POSTPROCEDURAL STATES

## 2020-06-29 NOTE — PN
Physical Exam: 


SUBJECTIVE: Patient seen and examined. Pt feeling much better. No acute events 

overnight. Pt reports having diplopia and mild disorientation, but improved 

today, no further c/o. Will f/u with neurosurgery. 








OBJECTIVE:





                                   Vital Signs











 Period  Temp  Pulse  Resp  BP Sys/Beard  Pulse Ox


 


 Last 24 Hr  98.3 F-101.5 F  80-94  12-26  115-153/45-98  











GENERAL: The patient is awake, alert, and fully oriented, in no acute distress.


EYES: PERRL, extraocular movements intact, sclera anicteric, conjunctiva clear. 

No ptosis. 


ENT:  moist mucous membranes.


NECK: Trachea midline, full range of motion, supple. 


LUNGS: Breath sounds equal, clear to auscultation bilaterally, no wheezes, no 

crackles


HEART: Regular rate and rhythm, S1, S2 without murmur, rub or gallop.


ABDOMEN: Soft, nontender, nondistended, normoactive bowel sounds, no guarding


EXTREMITIES: 2+ pulses, warm, well-perfused, no edema. 


NEUROLOGICAL: Cranial nerves II through XII grossly intact. Normal speech


SKIN: Warm, dry, normal turgor, no rashes or lesions noted














                         Laboratory Results - last 24 hr











  06/25/20 06/29/20 06/29/20





  06:10 05:46 05:46


 


WBC   7.4 


 


RBC   2.99 L 


 


Hgb   9.0 L 


 


Hct   26.6 L D 


 


MCV   88.9 


 


MCH   30.2 


 


MCHC   33.9 


 


RDW   14.3 


 


Plt Count   139  D 


 


MPV   9.1 


 


Absolute Neuts (auto)   5.7 


 


Neutrophils %   76.4 


 


Lymphocytes %   15.5 


 


Monocytes %   6.3 


 


Eosinophils %   1.5  D 


 


Basophils %   0.3 


 


Nucleated RBC %   0 


 


Sodium    145


 


Potassium    3.5


 


Chloride    105


 


Carbon Dioxide    36 H


 


Anion Gap    4 L


 


BUN    11.6


 


Creatinine    0.8


 


Est GFR (CKD-EPI)AfAm    103.44


 


Est GFR (CKD-EPI)NonAf    89.25


 


Random Glucose    131 H


 


Calcium    7.8 L


 


Phosphorus    2.4 L


 


Magnesium    2.1


 


Total Bilirubin    0.4


 


AST    23


 


ALT    24


 


Alkaline Phosphatase    117


 


Total Protein    5.2 L


 


Albumin    2.3 L


 


Blood Type  B POSITIVE  


 


Antibody Screen  Negative  


 


Crossmatch  See Detail  








Active Medications











Generic Name Dose Route Start Last Admin





  Trade Name Freq  PRN Reason Stop Dose Admin


 


Ascorbic Acid  500 mg  06/25/20 22:00  06/29/20 09:22





  Vitamin C -  PO   500 mg





  BID ANTELMO   Administration


 


Atorvastatin Calcium  40 mg  06/25/20 22:00  06/28/20 21:32





  Lipitor -  PO   40 mg





  HS ANTELMO   Administration


 


Chlorhexidine Gluconate  1 applic  06/25/20 22:00  06/28/20 22:08





  Hibiclens For Decolonization -  TP   1 applic





  HS ANTELMO   Administration


 


Clonazepam  1 mg  06/28/20 13:46 





  Klonopin -  PO  





  BID PRN  





  ANXIETY  


 


Diphenhydramine HCl  25 mg  06/25/20 16:15 





  Benadryl Injection -  IVPUSH  





  ONCE PRN  





  FOR ITCHING  


 


Hydrochlorothiazide  25 mg  06/26/20 10:00  06/29/20 09:22





  Hctz -  PO   25 mg





  DAILY ANTELMO   Administration


 


Multivitamins/Minerals/Vitamin C  1 tab  06/26/20 10:00  06/29/20 09:21





  Tab-A-Vit -  PO   1 tab





  DAILY ANTELMO   Administration


 


Mupirocin  1 applic  06/25/20 22:00  06/29/20 09:33





  Bactroban Ointment (For Decolonization) -  NS  06/30/20 21:59  1 applic





  BID ANTELMO   Administration


 


Naloxone HCl  0.4 mg  06/25/20 16:15 





  Narcan -  IVPUSH  





  ONCE PRN  





  Sedation  


 


Oxycodone HCl  10 mg  06/26/20 16:17  06/29/20 09:24





  Oxycontin -  PO   Not Given





  BID ANTELMO  


 


Pantoprazole Sodium  20 mg  06/26/20 10:00  06/29/20 09:22





  Protonix -  PO   20 mg





  DAILY ANTELMO   Administration


 


Polyethylene Glycol  17 gm  06/27/20 12:15  06/29/20 09:23





  Miralax (For Daily Use) -  PO   17 gm





  DAILY ANTELMO   Administration


 


Tamsulosin HCl  0.4 mg  06/26/20 08:30  06/29/20 08:32





  Flomax -  PO   0.4 mg





  0830 ANTELMO   Administration


 


Venlafaxine HCl  75 mg  06/25/20 22:00  06/29/20 09:22





  Effexor Xr -  PO   75 mg





  BID ANTELMO   Administration











ASSESSMENT/PLAN:


74 M, pmh of htn, hld, hx pacs and pvcs, svt s/p ablation 2017, presents to [facility]

for elective spine surgery w/ Dr. Choudhri, Thad for lumbar spondylosis s/p 

T12-S2 Laminectomies, decompression, osteotomies, deformity correction, screw 

fusion is admitted for ICU monitoring post op





#Neuro


oxycodone for pain management as per surgery


gabapentin d/uriel as per PMD


klonopin BID


Venlafaxine BID


Tylenol Q6H


Narcan prn once





#Pulmonary


stable





#CV


svt s/p ablation 2017


EKG shows sinus with frequent APCs, PSVT, nonsustained VT


HCTZ 25


Lipitor 40





#GI


protonix 


zofran 





#ID


stable





#Derm


Benadryl for itching 





#


flomax





#Psych


unable to verify dx


effexor 


Klonopin 





#Heme


Normocytic anemia 





#DVT


hep sq





#GI ppx


Protonix





FEN


regular diet


monitor lytes





Dispo: cleared by surgery to transfer to med-surg, cont PT with TLSO brace











Visit type





- Emergency Visit


Emergency Visit: Yes


ED Registration Date: 06/25/20


Care time: The patient presented to the Emergency Department on the above date 

and was hospitalized for further evaluation of their emergent condition.





- New Patient


This patient is new to me today: Yes


Date on this admission: 06/30/20





- Critical Care


Critical Care patient: No





- Discharge Referral


Referred to [facility] No





ATTENDING PHYSICIAN STATEMENT





I saw and evaluated the patient.


I reviewed the resident's note and discussed the case with the resident.


I agree with the resident's findings and plan as documented.








SUBJECTIVE:








OBJECTIVE:








ASSESSMENT AND PLAN:

## 2020-06-29 NOTE — PN
Progress Note, Physician


History of Present Illness: 


Pt seen/ examined in icu 


chart is reviewed


s/p - Laminectomy


comfortable


wife at bedside.


alert/ awake


No complains


offered




















- Current Medication List


Current Medications: 


Active Medications





Ascorbic Acid (Vitamin C -)  500 mg PO BID [facility]


   Last Admin: 06/29/20 09:22 Dose:  500 mg


   Documented by: 


Atorvastatin Calcium (Lipitor -)  40 mg PO [facility]


   Last Admin: 06/28/20 21:32 Dose:  40 mg


   Documented by: 


Chlorhexidine Gluconate (Hibiclens For Decolonization -)  1 applic TP [facility]


   Last Admin: 06/28/20 22:08 Dose:  1 applic


   Documented by: 


Clonazepam (Klonopin -)  1 mg PO BID PRN


   PRN Reason: ANXIETY


Diphenhydramine HCl (Benadryl Injection -)  25 mg IVPUSH ONCE PRN


   PRN Reason: FOR ITCHING


Hydrochlorothiazide (Hctz -)  25 mg PO DAILY [facility]


   Last Admin: 06/29/20 09:22 Dose:  25 mg


   Documented by: 


Multivitamins/Minerals/Vitamin C (Tab-A-Vit -)  1 tab PO DAILY [facility]


   Last Admin: 06/29/20 09:21 Dose:  1 tab


   Documented by: 


Mupirocin (Bactroban Ointment (For Decolonization) -)  1 applic NS BID [facility]


   Stop: 06/30/20 21:59


   Last Admin: 06/29/20 09:33 Dose:  1 applic


   Documented by: 


Naloxone HCl (Narcan -)  0.4 mg IVPUSH ONCE PRN


   PRN Reason: Sedation


Oxycodone HCl (Oxycontin -)  10 mg PO BID [facility]


   Last Admin: 06/29/20 09:24 Dose:  Not Given


   Documented by: 


Pantoprazole Sodium (Protonix -)  20 mg PO DAILY [facility]


   Last Admin: 06/29/20 09:22 Dose:  20 mg


   Documented by: 


Polyethylene Glycol (Miralax (For Daily Use) -)  17 gm PO DAILY [facility]


   Last Admin: 06/29/20 09:23 Dose:  17 gm


   Documented by: 


Tamsulosin HCl (Flomax -)  0.4 mg PO 0830 [facility]


   Last Admin: 06/29/20 08:32 Dose:  0.4 mg


   Documented by: 


Venlafaxine HCl (Effexor Xr -)  75 mg PO BID [facility]


   Last Admin: 06/29/20 09:22 Dose:  75 mg


   Documented by: 











- Objective


Vital Signs: 


                                   Vital Signs











Temperature  99.3 F   06/29/20 14:00


 


Pulse Rate  91 H  06/29/20 16:00


 


Respiratory Rate  20 06/29/20 16:00


 


Blood Pressure  148/64   06/29/20 16:00


 


O2 Sat by Pulse Oximetry (%)  93 L  06/29/20 09:00











Constitutional: Yes: No Distress, Calm


Eyes: Yes: Conjunctiva Clear


Neck: Yes: Supple


Cardiovascular: Yes: Regular Rate and Rhythm


Respiratory: Yes: CTA Bilaterally


Gastrointestinal: Yes: Soft


Genitourinary: Yes: Padron Present


Edema: No


Neurological: Yes: Alert


Psychiatric: Yes: Alert


Labs: 


                                    CBC, BMP





                                 06/29/20 05:46 





                                 06/29/20 05:46 











Problem List





- Problems


(1) S/P laminectomy


Code(s): Z98.890 - OTHER SPECIFIED POSTPROCEDURAL STATES   





(2) Hepatitis C


Code(s): B19.20 - UNSPECIFIED VIRAL HEPATITIS C WITHOUT HEPATIC COMA   





(3) History of cholecystectomy


Code(s): Z90.49 - ACQUIRED ABSENCE OF OTHER SPECIFIED PARTS OF DIGESTIVE TRACT  







(4) History of penile implant


Code(s): Z96.0 - PRESENCE OF UROGENITAL IMPLANTS   





(5) History of cardiac radiofrequency ablation


Code(s): Z98.890 - OTHER SPECIFIED POSTPROCEDURAL STATES   





(6) Hypertension


Code(s): I10 - ESSENTIAL (PRIMARY) HYPERTENSION   





(7) Generalized anxiety disorder


Code(s): F41.1 - GENERALIZED ANXIETY DISORDER   





(8) PTSD (post-traumatic stress disorder)


Code(s): F43.10 - POST-TRAUMATIC STRESS DISORDER, UNSPECIFIED   





Assessment/Plan





Stable


continue present care


padron to be removed


drain -- to be removed tomorrow


PT


Incentive spirometry


Will follow


D/W pt/ pts wife as well as RN also.

## 2020-06-29 NOTE — ECHO
______________________________________________________________________________



Name: HARLAN LIANG                                   Exam:Adult Echocardiogram

MRN: [id number]         Study Date: 2020 07:57 AM

Age: 74 yrs

______________________________________________________________________________



Reason For Study: PVCS/NSVT

Height: 66 in        Weight: 180 lb        BSA: 1.9 m2



______________________________________________________________________________



MMode/2D Measurements & Calculations

IVSd: 0.93 cm                                           Ao root diam: 2.3 cm

LVIDd: 4.8 cm                                           LA dimension: 3.3 cm

LVIDs: 2.8 cm

LVPWd: 0.90 cm



_________________________________________________________

EDV(Teich): 108.5 ml                                    LVOT diam: 2.0 cm

ESV(Teich): 30.2 ml



_________________________________________________________

LAV (MOD-bp): 52.7 ml



Doppler Measurements & Calculations

MV E max jason: 85.4 cm/sec                                  Ao V2 max: 165.6 cm/sec

MV A max jason: 114.9 cm/sec                                 Ao max P.0 mmHg

MV E/A: 0.74

MV dec time: 0.22 sec                                      DIAMOND(V,D): 2.0 cm2



____________________________________________________________

LV V1 max P.0 mmHg                                     PA V2 max: 116.7 cm/sec

LV V1 max: 112.0 cm/sec                                    PA max P.5 mmHg



____________________________________________________________

Med Peak E' Jason: 6.4 cm/sec

Med E/e': 13.3

Lat Peak E' Jason: 7.6 cm/sec

Lat E/e': 11.2





______________________________________________________________________________

Procedure

Study Quality: Fair.

Left Ventricle

The left ventricular size, thickness and function are normal. The left ventricular ejection fraction 
is

normal. Ejection Fraction = 55-60%. The transmitral spectral Doppler flow pattern is suggestive of im
paired LV

relaxation.

Right Ventricle

The right ventricle is normal in size and function.

Atria

Normal left and right atrial size and function.

Mitral Valve

The mitral valve leaflets appear normal. There is no evidence of stenosis, fluttering, or prolapse. T
here is

mild mitral annular calcification. There is trace mitral regurgitation.

Tricuspid Valve

The tricuspid valve is normal in structure and function.

Aortic Valve

There is mild aortic sclerosis.;. No hemodynamically significant valvular aortic stenosis. No aortic

regurgitation is present.

Pulmonic Valve

The pulmonic valve leaflets are thin and pliable; valve motion is normal. There is no pulmonic valvul
ar

regurgitation.

Great Vessels

The aortic root is normal size.

Pericardium/Pleura

There is no pericardial effusion.

______________________________________________________________________________





Interpretation Summary

LV: Normal size and systolic function,EF 55-60%, Impaired relaxation

RV: Normal

Mild rubi joey annular calcification.

Mildly sclerotic aortic valve.





Chau Sharpe 2020 09:31 AM

## 2020-06-29 NOTE — PN
Progress Note (short form)





- Note


Progress Note: 


Chief Complaint: 





atrial arrhythmia


History of Present Illness: 





denies sob, cp, palp, dizzy





- Current Medication List


Current Medications: 


Active Medications


                               Current Medications











Generic Name Dose Route Start Last Admin





  Trade Name Freq  PRN Reason Stop Dose Admin


 


Ascorbic Acid  500 mg  06/25/20 22:00  06/29/20 09:22





  Vitamin C -  PO   500 mg





  BID ANTELMO   Administration


 


Atorvastatin Calcium  40 mg  06/25/20 22:00  06/28/20 21:32





  Lipitor -  PO   40 mg





  HS ANTELMO   Administration


 


Chlorhexidine Gluconate  1 applic  06/25/20 22:00  06/28/20 22:08





  Hibiclens For Decolonization -  TP   1 applic





  HS ANTELMO   Administration


 


Clonazepam  1 mg  06/28/20 13:46 





  Klonopin -  PO  





  BID PRN  





  ANXIETY  


 


Diphenhydramine HCl  25 mg  06/25/20 16:15 





  Benadryl Injection -  IVPUSH  





  ONCE PRN  





  FOR ITCHING  


 


Hydrochlorothiazide  25 mg  06/26/20 10:00  06/29/20 09:22





  Hctz -  PO   25 mg





  DAILY ANTELMO   Administration


 


Multivitamins/Minerals/Vitamin C  1 tab  06/26/20 10:00  06/29/20 09:21





  Tab-A-Vit -  PO   1 tab





  DAILY ANTELMO   Administration


 


Mupirocin  1 applic  06/25/20 22:00  06/29/20 09:33





  Bactroban Ointment (For Decolonization) -  NS  06/30/20 21:59  1 applic





  BID ANTELMO   Administration


 


Naloxone HCl  0.4 mg  06/25/20 16:15 





  Narcan -  IVPUSH  





  ONCE PRN  





  Sedation  


 


Oxycodone HCl  10 mg  06/26/20 16:17  06/29/20 09:24





  Oxycontin -  PO   Not Given





  BID ANTELMO  


 


Pantoprazole Sodium  20 mg  06/26/20 10:00  06/29/20 09:22





  Protonix -  PO   20 mg





  DAILY ANTELMO   Administration


 


Polyethylene Glycol  17 gm  06/27/20 12:15  06/29/20 09:23





  Miralax (For Daily Use) -  PO   17 gm





  DAILY ANTELMO   Administration


 


Tamsulosin HCl  0.4 mg  06/26/20 08:30  06/29/20 08:32





  Flomax -  PO   0.4 mg





  0830 ANTELMO   Administration


 


Venlafaxine HCl  75 mg  06/25/20 22:00  06/29/20 09:22





  Effexor Xr -  PO   75 mg





  BID ANTELMO   Administration








                                   Vital Signs











 Period  Temp  Pulse  Resp  BP Sys/Beard  Pulse Ox


 


 Last 24 Hr  98.3 F-101.5 F  80-96  12-26  115-146/45-98  











Constitutional: Yes: Well Nourished, No Distress, Calm


Cardiovascular: Yes: Regular Rate and Rhythm.  No: JVD, Gallop, Murmur


Respiratory: Yes: Regular, CTA Bilaterally.  No: Accessory Muscle Use


Extremities: No: Cold


Edema: No (SCDs)


Neurological: Yes: Alert.  No: Lethargy, Seizure


Psychiatric: No: Agitated


Labs: 


                                    CBC, BMP





                                 06/29/20 05:46 





                                 06/29/20 05:46 











Assessment/Plan


tele: SR, brief atrial run





echo 6/2020: nl lv/rv, no sig valve path








a/p:  74 m hx htn, hld, hx pacs and pvcs, svt s/p ablation 2017, s/p elective 

spine surgery.





sinus with frequent APCs, PSVT, nonsustained VT:


-tele benign


-echo here unremarkable





htn:


-bp stable


-cont current meds





hld:


-cont home med





svt s/p ablation:


-has been in sr here, brief run PSVT on tele





s/p laminectomy:


-Post op management as per Critical Care an Neurosurgery

## 2020-06-30 LAB
ANION GAP SERPL CALC-SCNC: 6 MMOL/L (ref 8–16)
BUN SERPL-MCNC: 10.4 MG/DL (ref 7–18)
CALCIUM SERPL-MCNC: 8.2 MG/DL (ref 8.5–10.1)
CHLORIDE SERPL-SCNC: 104 MMOL/L (ref 98–107)
CO2 SERPL-SCNC: 34 MMOL/L (ref 21–32)
CREAT SERPL-MCNC: 0.7 MG/DL (ref 0.55–1.3)
DEPRECATED RDW RBC AUTO: 14.4 % (ref 11.9–15.9)
GLUCOSE SERPL-MCNC: 104 MG/DL (ref 74–106)
HCT VFR BLD CALC: 27.5 % (ref 35.4–49)
HGB BLD-MCNC: 9.3 GM/DL (ref 11.7–16.9)
MAGNESIUM SERPL-MCNC: 2.2 MG/DL (ref 1.8–2.4)
MCH RBC QN AUTO: 29.7 PG (ref 25.7–33.7)
MCHC RBC AUTO-ENTMCNC: 33.9 G/DL (ref 32–35.9)
MCV RBC: 87.4 FL (ref 80–96)
PHOSPHATE SERPL-MCNC: 3.6 MG/DL (ref 2.5–4.9)
PLATELET # BLD AUTO: 158 K/MM3 (ref 134–434)
PMV BLD: 8.6 FL (ref 7.5–11.1)
POTASSIUM SERPLBLD-SCNC: 3.6 MMOL/L (ref 3.5–5.1)
RBC # BLD AUTO: 3.15 M/MM3 (ref 4–5.6)
SODIUM SERPL-SCNC: 143 MMOL/L (ref 136–145)
WBC # BLD AUTO: 7 K/MM3 (ref 4–10)

## 2020-06-30 RX ADMIN — ACETAMINOPHEN PRN MG: 325 TABLET ORAL at 11:40

## 2020-06-30 RX ADMIN — MUPIROCIN SCH APPLIC: 20 OINTMENT TOPICAL at 10:03

## 2020-06-30 RX ADMIN — TAMSULOSIN HYDROCHLORIDE SCH MG: 0.4 CAPSULE ORAL at 08:38

## 2020-06-30 RX ADMIN — VENLAFAXINE HYDROCHLORIDE SCH MG: 75 CAPSULE, EXTENDED RELEASE ORAL at 10:00

## 2020-06-30 RX ADMIN — HYDROCHLOROTHIAZIDE SCH MG: 25 TABLET ORAL at 10:00

## 2020-06-30 RX ADMIN — PANTOPRAZOLE SODIUM SCH MG: 20 TABLET, DELAYED RELEASE ORAL at 10:00

## 2020-06-30 RX ADMIN — OXYCODONE HYDROCHLORIDE AND ACETAMINOPHEN SCH MG: 500 TABLET ORAL at 22:07

## 2020-06-30 RX ADMIN — MULTIVITAMIN TABLET SCH TAB: TABLET at 10:00

## 2020-06-30 RX ADMIN — OXYCODONE HYDROCHLORIDE SCH: 10 TABLET, FILM COATED, EXTENDED RELEASE ORAL at 10:00

## 2020-06-30 RX ADMIN — OXYCODONE HYDROCHLORIDE SCH: 10 TABLET, FILM COATED, EXTENDED RELEASE ORAL at 22:08

## 2020-06-30 RX ADMIN — ATORVASTATIN CALCIUM SCH MG: 40 TABLET, FILM COATED ORAL at 22:07

## 2020-06-30 RX ADMIN — OXYCODONE HYDROCHLORIDE AND ACETAMINOPHEN SCH MG: 500 TABLET ORAL at 10:00

## 2020-06-30 RX ADMIN — VENLAFAXINE HYDROCHLORIDE SCH MG: 75 CAPSULE, EXTENDED RELEASE ORAL at 22:07

## 2020-06-30 RX ADMIN — CHLORHEXIDINE GLUCONATE SCH: 213 SOLUTION TOPICAL at 22:06

## 2020-06-30 RX ADMIN — POLYETHYLENE GLYCOL 3350 SCH GM: 17 POWDER, FOR SOLUTION ORAL at 10:03

## 2020-06-30 NOTE — PN
Progress Note (short form)





- Note


Progress Note: 





POD#5 Laminectomy





wife at bedside


noted he had spiked a temp last night and the night before


no diarrhea, no abd pain


back pain controlled


 no cough or SOB 


KIAH drain removed


                               Vital Signs - 24 hr











  06/29/20 06/29/20 06/29/20





  16:00 18:00 19:34


 


Temperature  101.4 F H 


 


Pulse Rate 91 H 91 H 


 


Respiratory 20 20 





Rate   


 


Blood Pressure 148/64 145/83 


 


O2 Sat by Pulse   97





Oximetry (%)   














  06/29/20 06/29/20 06/30/20





  20:00 22:00 00:00


 


Temperature  99.5 F 


 


Pulse Rate 89 88 75


 


Respiratory 20 24 H 22 H





Rate   


 


Blood Pressure 130/86 139/53 L 128/64


 


O2 Sat by Pulse   





Oximetry (%)   














  06/30/20 06/30/20 06/30/20





  02:00 04:00 06:00


 


Temperature 98.6 F  98.3 F


 


Pulse Rate 80 77 84


 


Respiratory 23 H 22 H 30 H





Rate   


 


Blood Pressure 144/83 151/67 164/98


 


O2 Sat by Pulse   





Oximetry (%)   














  06/30/20 06/30/20 06/30/20





  08:00 09:00 10:00


 


Temperature   98 F


 


Pulse Rate 86  90


 


Respiratory 20  22 H





Rate   


 


Blood Pressure 134/76  131/71


 


O2 Sat by Pulse  95 





Oximetry (%)   














  06/30/20 06/30/20





  12:00 14:00


 


Temperature  99.5 F


 


Pulse Rate 88 


 


Respiratory 20 20





Rate  


 


Blood Pressure 135/72 140/79


 


O2 Sat by Pulse  





Oximetry (%)  








                               Current Medications











Generic Name Dose Route Start Last Admin





  Trade Name Dilipq  PRN Reason Stop Dose Admin


 


Acetaminophen  650 mg  06/29/20 18:58  06/30/20 11:40





  Tylenol -  PO   650 mg





  Q6H PRN   Administration





  FEVER  


 


Ascorbic Acid  500 mg  06/25/20 22:00  06/30/20 10:00





  Vitamin C -  PO   500 mg





  BID ANTELMO   Administration


 


Atorvastatin Calcium  40 mg  06/25/20 22:00  06/29/20 21:10





  Lipitor -  PO   40 mg





  HS ANTELMO   Administration


 


Chlorhexidine Gluconate  1 applic  06/25/20 22:00  06/29/20 21:10





  Hibiclens For Decolonization -  TP   1 applic





  HS ANTELMO   Administration


 


Clonazepam  1 mg  06/28/20 13:46  06/29/20 21:10





  Klonopin -  PO   1 mg





  BID PRN   Administration





  ANXIETY  


 


Diphenhydramine HCl  25 mg  06/25/20 16:15 





  Benadryl Injection -  IVPUSH  





  ONCE PRN  





  FOR ITCHING  


 


Hydrochlorothiazide  25 mg  06/26/20 10:00  06/30/20 10:00





  Hctz -  PO   25 mg





  DAILY ANTELMO   Administration


 


Multivitamins/Minerals/Vitamin C  1 tab  06/26/20 10:00  06/30/20 10:00





  Tab-A-Vit -  PO   1 tab





  DAILY ANTELMO   Administration


 


Mupirocin  1 applic  06/25/20 22:00  06/30/20 10:03





  Bactroban Ointment (For Decolonization) -  NS  06/30/20 21:59  1 applic





  BID ANTELMO   Administration


 


Naloxone HCl  0.4 mg  06/25/20 16:15 





  Narcan -  IVPUSH  





  ONCE PRN  





  Sedation  


 


Oxycodone HCl  10 mg  06/26/20 16:17  06/30/20 10:00





  Oxycontin -  PO   Not Given





  BID ANTELMO  


 


Pantoprazole Sodium  20 mg  06/26/20 10:00  06/30/20 10:00





  Protonix -  PO   20 mg





  DAILY ANTELMO   Administration


 


Polyethylene Glycol  17 gm  06/27/20 12:15  06/30/20 10:03





  Miralax (For Daily Use) -  PO   17 gm





  DAILY ANTELMO   Administration


 


Tamsulosin HCl  0.4 mg  06/26/20 08:30  06/30/20 08:38





  Flomax -  PO   0.4 mg





  0830 ANTELMO   Administration


 


Venlafaxine HCl  75 mg  06/25/20 22:00  06/30/20 10:00





  Effexor Xr -  PO   75 mg





  BID ANTELMO   Administration








                         Laboratory Results - last 24 hr











  06/30/20 06/30/20





  05:25 05:25


 


WBC  7.0 


 


RBC  3.15 L 


 


Hgb  9.3 L 


 


Hct  27.5 L 


 


MCV  87.4 


 


MCH  29.7 


 


MCHC  33.9 


 


RDW  14.4 


 


Plt Count  158 


 


MPV  8.6 


 


Sodium   143


 


Potassium   3.6


 


Chloride   104


 


Carbon Dioxide   34 H


 


Anion Gap   6 L


 


BUN   10.4


 


Creatinine   0.7


 


Est GFR (CKD-EPI)AfAm   109.27


 


Est GFR (CKD-EPI)NonAf   94.28


 


Random Glucose   104


 


Calcium   8.2 L


 


Phosphorus   3.6


 


Magnesium   2.2














S1 S2 RRR


Lungs decreased


Abd- soft, obese, NT


no edema








A/P


Problem List





- Problems


(1) S/P laminectomy


Assessment/Plan: 





pain control- may give tylenol -- try to use Oxycodone sparingly


Pt eval


Back brace 








Problems reviewed: Yes   


Code(s): Z98.890 - OTHER SPECIFIED POSTPROCEDURAL STATES   





(2) Hepatitis C


Assessment/Plan: 


s/p treatment


Problems reviewed: Yes   


Code(s): B19.20 - UNSPECIFIED VIRAL HEPATITIS C WITHOUT HEPATIC COMA   





(3) History of cholecystectomy


Assessment/Plan: 


no active issues


Problems reviewed: Yes   


Code(s): Z90.49 - ACQUIRED ABSENCE OF OTHER SPECIFIED PARTS OF DIGESTIVE TRACT  







(4) History of penile implant


Assessment/Plan: 


No active issues


Problems reviewed: Yes   


Code(s): Z96.0 - PRESENCE OF UROGENITAL IMPLANTS   





(5) History of cardiac radiofrequency ablation


Assessment/Plan: 


cardiology following


Code(s): Z98.890 - OTHER SPECIFIED POSTPROCEDURAL STATES   





(6) Hypertension


Assessment/Plan: 


on meds


Problems reviewed: Yes   


Code(s): I10 - ESSENTIAL (PRIMARY) HYPERTENSION   





(7) Generalized anxiety disorder


Problems reviewed: Yes   


Code(s): F41.1 - GENERALIZED ANXIETY DISORDER   





(8) PTSD (post-traumatic stress disorder)


Problems reviewed: Yes   


Code(s): F43.10 - POST-TRAUMATIC STRESS DISORDER, UNSPECIFIED   








Anemia-->monitor , no active bleeding 


hypokalemia-- replace lytes, replace Phosphorus


Fever


-- he was confused during the weekend


cultures pending


normal WBC


CXR normal


start DVT prophylaxis





Hold off discharge today-- if he remains afebrile for 24 hours , with negative 

blood cultures, will dc him to [facility]-- RN,  made aware





Problem List





- Problems


(1) Hepatitis C


Code(s): B19.20 - UNSPECIFIED VIRAL HEPATITIS C WITHOUT HEPATIC COMA   





(2) S/P laminectomy


Code(s): Z98.890 - OTHER SPECIFIED POSTPROCEDURAL STATES   





(3) History of back surgery


Code(s): Z98.890 - OTHER SPECIFIED POSTPROCEDURAL STATES   





(4) Hypercholesterolemia


Code(s): E78.00 - PURE HYPERCHOLESTEROLEMIA, UNSPECIFIED   





(5) Hypertension


Code(s): I10 - ESSENTIAL (PRIMARY) HYPERTENSION   





(6) PTSD (post-traumatic stress disorder)


Code(s): F43.10 - POST-TRAUMATIC STRESS DISORDER, UNSPECIFIED

## 2020-06-30 NOTE — PN
Progress Note (short form)





- Note


Progress Note: 





Chief Complaint: 





atrial arrhythmia


History of Present Illness: 





denies sob, cp, palp, dizzy


                               Current Medications











Generic Name Dose Route Start Last Admin





  Trade Name Lorena  PRN Reason Stop Dose Admin


 


Acetaminophen  650 mg  06/29/20 18:58  06/30/20 11:40





  Tylenol -  PO   650 mg





  Q6H PRN   Administration





  FEVER  


 


Ascorbic Acid  500 mg  06/25/20 22:00  06/30/20 10:00





  Vitamin C -  PO   500 mg





  BID ANTELMO   Administration


 


Atorvastatin Calcium  40 mg  06/25/20 22:00  06/29/20 21:10





  Lipitor -  PO   40 mg





  HS ANTELMO   Administration


 


Chlorhexidine Gluconate  1 applic  06/25/20 22:00  06/29/20 21:10





  Hibiclens For Decolonization -  TP   1 applic





  HS ANTELMO   Administration


 


Clonazepam  1 mg  06/28/20 13:46  06/29/20 21:10





  Klonopin -  PO   1 mg





  BID PRN   Administration





  ANXIETY  


 


Diphenhydramine HCl  25 mg  06/25/20 16:15 





  Benadryl Injection -  IVPUSH  





  ONCE PRN  





  FOR ITCHING  


 


Enoxaparin Sodium  40 mg  07/01/20 10:00 





  Lovenox -  SQ  





  DAILY ANTELMO  


 


Hydrochlorothiazide  25 mg  06/26/20 10:00  06/30/20 10:00





  Hctz -  PO   25 mg





  DAILY ANTELMO   Administration


 


Multivitamins/Minerals/Vitamin C  1 tab  06/26/20 10:00  06/30/20 10:00





  Tab-A-Vit -  PO   1 tab





  DAILY ANTELMO   Administration


 


Mupirocin  1 applic  06/25/20 22:00  06/30/20 10:03





  Bactroban Ointment (For Decolonization) -  NS  06/30/20 21:59  1 applic





  BID ANTELMO   Administration


 


Naloxone HCl  0.4 mg  06/25/20 16:15 





  Narcan -  IVPUSH  





  ONCE PRN  





  Sedation  


 


Oxycodone HCl  10 mg  06/26/20 16:17  06/30/20 10:00





  Oxycontin -  PO   Not Given





  BID ANTELMO  


 


Pantoprazole Sodium  20 mg  06/26/20 10:00  06/30/20 10:00





  Protonix -  PO   20 mg





  DAILY ANTELMO   Administration


 


Polyethylene Glycol  17 gm  06/27/20 12:15  06/30/20 10:03





  Miralax (For Daily Use) -  PO   17 gm





  DAILY ANTELMO   Administration


 


Tamsulosin HCl  0.4 mg  06/26/20 08:30  06/30/20 08:38





  Flomax -  PO   0.4 mg





  0830 ANTELMO   Administration


 


Venlafaxine HCl  75 mg  06/25/20 22:00  06/30/20 10:00





  Effexor Xr -  PO   75 mg





  BID ANTELMO   Administration








                                   Vital Signs











 Period  Temp  Pulse  Resp  BP Sys/Beard  Pulse Ox


 


 Last 24 Hr  98 F-101.4 F  75-91  20-30  128-164/53-98  95-97











Constitutional: Yes: Well Nourished, No Distress, Calm


Cardiovascular: Yes: Regular Rate and Rhythm.  No: JVD, Gallop, Murmur


Respiratory: Yes: Regular, CTA Bilaterally.  No: Accessory Muscle Use


abd: soft, ND, NT + bs


Extremities: No: Cold


Edema: No (SCDs)


Neurological: Yes: Alert.  No: Lethargy, Seizure


Psychiatric: No: Agitated


no jaundice, diaphoresis





Assessment/Plan


tele: SR, brief atrial run





echo 6/2020: nl lv/rv, no sig valve path








a/p:  74 m hx htn, hld, hx pacs and pvcs, svt s/p ablation 2017, s/p elective 

spine surgery.





sinus with frequent APCs, PSVT, nonsustained VT:


-tele benign


-echo here unremarkable





htn:


-cont current meds





hld:


-cont home med





svt s/p ablation:


-has been in sr here, brief run PSVT on tele





s/p laminectomy:


-Post op management as per Critical Care an Neurosurgery

## 2020-06-30 NOTE — PN
Physical Exam: 


SUBJECTIVE: Patient seen and examined. Pt feeling much better. No acute events 

overnight. Pt reports having confusion and disorientation, but improved today, 

no further c/o. Will f/u with neurosurgery. 


OBJECTIVE:





                                   Vital Signs











 Period  Temp  Pulse  Resp  BP Sys/Beard  Pulse Ox


 


 Last 24 Hr  98 F-101.4 F  75-93  16-30  128-164/53-98  95-97





GENERAL: The patient is awake, alert, and fully oriented, in no acute distress.


EYES: PERRL, extraocular movements intact, sclera anicteric, conjunctiva clear. 

No ptosis. 


ENT:  moist mucous membranes.


NECK: Trachea midline, full range of motion, supple. 


LUNGS: Breath sounds equal, clear to auscultation bilaterally, no wheezes, no 

crackles


HEART: Regular rate and rhythm, S1, S2 without murmur, rub or gallop.


ABDOMEN: Soft, nontender, nondistended, normoactive bowel sounds, no guarding


EXTREMITIES: 2+ pulses, warm, well-perfused, no edema. 


NEUROLOGICAL: Cranial nerves II through XII grossly intact. Normal speech


SKIN: Warm, dry, normal turgor, no rashes or lesions noted





                         Laboratory Results - last 24 hr











  06/30/20 06/30/20





  05:25 05:25


 


WBC  7.0 


 


RBC  3.15 L 


 


Hgb  9.3 L 


 


Hct  27.5 L 


 


MCV  87.4 


 


MCH  29.7 


 


MCHC  33.9 


 


RDW  14.4 


 


Plt Count  158 


 


MPV  8.6 


 


Sodium   143


 


Potassium   3.6


 


Chloride   104


 


Carbon Dioxide   34 H


 


Anion Gap   6 L


 


BUN   10.4


 


Creatinine   0.7


 


Est GFR (CKD-EPI)AfAm   109.27


 


Est GFR (CKD-EPI)NonAf   94.28


 


Random Glucose   104


 


Calcium   8.2 L


 


Phosphorus   3.6


 


Magnesium   2.2








Active Medications











Generic Name Dose Route Start Last Admin





  Trade Name Freq  PRN Reason Stop Dose Admin


 


Acetaminophen  650 mg  06/29/20 18:58  06/30/20 11:40





  Tylenol -  PO   650 mg





  Q6H PRN   Administration





  FEVER  


 


Ascorbic Acid  500 mg  06/25/20 22:00  06/30/20 10:00





  Vitamin C -  PO   500 mg





  BID ANTELMO   Administration


 


Atorvastatin Calcium  40 mg  06/25/20 22:00  06/29/20 21:10





  Lipitor -  PO   40 mg





  HS ANTELMO   Administration


 


Chlorhexidine Gluconate  1 applic  06/25/20 22:00  06/29/20 21:10





  Hibiclens For Decolonization -  TP   1 applic





  HS ANTELMO   Administration


 


Clonazepam  1 mg  06/28/20 13:46  06/29/20 21:10





  Klonopin -  PO   1 mg





  BID PRN   Administration





  ANXIETY  


 


Diphenhydramine HCl  25 mg  06/25/20 16:15 





  Benadryl Injection -  IVPUSH  





  ONCE PRN  





  FOR ITCHING  


 


Hydrochlorothiazide  25 mg  06/26/20 10:00  06/30/20 10:00





  Hctz -  PO   25 mg





  DAILY ANTELMO   Administration


 


Multivitamins/Minerals/Vitamin C  1 tab  06/26/20 10:00  06/30/20 10:00





  Tab-A-Vit -  PO   1 tab





  DAILY ANTELMO   Administration


 


Mupirocin  1 applic  06/25/20 22:00  06/30/20 10:03





  Bactroban Ointment (For Decolonization) -  NS  06/30/20 21:59  1 applic





  BID ANTELMO   Administration


 


Naloxone HCl  0.4 mg  06/25/20 16:15 





  Narcan -  IVPUSH  





  ONCE PRN  





  Sedation  


 


Oxycodone HCl  10 mg  06/26/20 16:17  06/30/20 10:00





  Oxycontin -  PO   Not Given





  BID ANTELMO  


 


Pantoprazole Sodium  20 mg  06/26/20 10:00  06/30/20 10:00





  Protonix -  PO   20 mg





  DAILY ANTELMO   Administration


 


Polyethylene Glycol  17 gm  06/27/20 12:15  06/30/20 10:03





  Miralax (For Daily Use) -  PO   17 gm





  DAILY ANTELMO   Administration


 


Tamsulosin HCl  0.4 mg  06/26/20 08:30  06/30/20 08:38





  Flomax -  PO   0.4 mg





  0830 ANTELMO   Administration


 


Venlafaxine HCl  75 mg  06/25/20 22:00  06/30/20 10:00





  Effexor Xr -  PO   75 mg





  BID ANTELMO   Administration











ASSESSMENT/PLAN:


74 M, pmh of htn, hld, hx pacs and pvcs, svt s/p ablation 2017, presents to [facility]

for elective spine surgery w/ Thad Erickson for lumbar spondylosis s/p 

T12-S2 Laminectomies, decompression, osteotomies, deformity correction, screw 

fusion is admitted for ICU monitoring post op





#Neuro


oxycodone for pain management as per surgery


gabapentin d/uriel as per PMD


klonopin BID


Venlafaxine BID


Tylenol Q6H


Narcan prn once





#Pulmonary


stable





#CV


svt s/p ablation 2017


EKG shows sinus with frequent APCs, PSVT, nonsustained VT


HCTZ 25


Lipitor 40





#GI


protonix 


zofran 





#ID


stable





#Derm


Benadryl for itching 





#


flomax





#Psych


unable to verify dx


effexor 


Klonopin 





#Heme


Normocytic anemia 





#DVT


hep sq





#GI ppx


Protonix





FEN


regular diet


monitor clarisa





Dispo: cleared by surgery for discharge,  cont PT with TLSO brace. Pt accepted 

to [facility]. Pt febrile last night, will wait 24hrs before sending to rehab, bed 

currently being held. D/c on hold as per Primary care physician.














Visit type





- Emergency Visit


Emergency Visit: Yes


ED Registration Date: 06/25/20


Care time: The patient presented to the Emergency Department on the above date 

and was hospitalized for further evaluation of their emergent condition.





- New Patient


This patient is new to me today: Yes


Date on this admission: 06/30/20





- Critical Care


Critical Care patient: No





- Discharge Referral


Referred to [facility] No





ATTENDING PHYSICIAN STATEMENT





I saw and evaluated the patient.


I reviewed the resident's note and discussed the case with the resident.


I agree with the resident's findings and plan as documented.








SUBJECTIVE:








OBJECTIVE:








ASSESSMENT AND PLAN:

## 2020-06-30 NOTE — PN
Teaching Attending Note


Name of Resident: Hernán Salmeron





ATTENDING PHYSICIAN STATEMENT





I saw and evaluated the patient.


I reviewed the resident's note and discussed the case with the resident.


I agree with the resident's findings and plan as documented.








SUBJECTIVE:


Patient seen and examined in the ICU. 


Awake and alert.   


No CP or SOB. 


Less back pain today. 





                                 Intake & Output











 06/27/20 06/28/20 06/29/20 06/30/20





 23:59 23:59 23:59 23:59


 


Intake Total 2295 1584 800 250


 


Output Total 2604 2310 1595 260


 


Balance -309 -726 -795 -10








                                Last Vital Signs











Temp Pulse Resp BP Pulse Ox


 


 98 F   90   22 H  131/71   95 


 


 06/30/20 10:00  06/30/20 10:00  06/30/20 10:00  06/30/20 10:00  06/30/20 09:00








Active Medications





Acetaminophen (Tylenol -)  650 mg PO Q6H PRN


   PRN Reason: FEVER


   Last Admin: 06/29/20 19:16 Dose:  650 mg


   Documented by: 


Ascorbic Acid (Vitamin C -)  500 mg PO BID [facility]


   Last Admin: 06/30/20 10:00 Dose:  500 mg


   Documented by: 


Atorvastatin Calcium (Lipitor -)  40 mg PO HS [facility]


   Last Admin: 06/29/20 21:10 Dose:  40 mg


   Documented by: 


Chlorhexidine Gluconate (Hibiclens For Decolonization -)  1 applic TP HS [facility]


   Last Admin: 06/29/20 21:10 Dose:  1 applic


   Documented by: 


Clonazepam (Klonopin -)  1 mg PO BID PRN


   PRN Reason: ANXIETY


   Last Admin: 06/29/20 21:10 Dose:  1 mg


   Documented by: 


Diphenhydramine HCl (Benadryl Injection -)  25 mg IVPUSH ONCE PRN


   PRN Reason: FOR ITCHING


Hydrochlorothiazide (Hctz -)  25 mg PO DAILY [facility]


   Last Admin: 06/30/20 10:00 Dose:  25 mg


   Documented by: 


Multivitamins/Minerals/Vitamin C (Tab-A-Vit -)  1 tab PO DAILY [facility]


   Last Admin: 06/30/20 10:00 Dose:  1 tab


   Documented by: 


Mupirocin (Bactroban Ointment (For Decolonization) -)  1 applic NS BID [facility]


   Stop: 06/30/20 21:59


   Last Admin: 06/30/20 10:03 Dose:  1 applic


   Documented by: 


Naloxone HCl (Narcan -)  0.4 mg IVPUSH ONCE PRN


   PRN Reason: Sedation


Oxycodone HCl (Oxycontin -)  10 mg PO BID [facility]


   Last Admin: 06/29/20 21:11 Dose:  Not Given


   Documented by: 


Pantoprazole Sodium (Protonix -)  20 mg PO DAILY [facility]


   Last Admin: 06/30/20 10:00 Dose:  20 mg


   Documented by: 


Polyethylene Glycol (Miralax (For Daily Use) -)  17 gm PO DAILY [facility]


   Last Admin: 06/30/20 10:03 Dose:  17 gm


   Documented by: 


Tamsulosin HCl (Flomax -)  0.4 mg PO 0830 [facility]


   Last Admin: 06/30/20 08:38 Dose:  0.4 mg


   Documented by: 


Venlafaxine HCl (Effexor Xr -)  75 mg PO BID [facility]


   Last Admin: 06/30/20 10:00 Dose:  75 mg


   Documented by: 








GENERAL: Awake and alert 


HEAD: Normal with no signs of trauma.


EYES: (-) Pallor  


ENT: nares patent 


NECK: Trachea midline, full range of motion, supple. 


LUNGS: Clear to auscultation bilaterally, no wheezes, no crackles, no accessory 

muscle use. 


HEART: Regular rate and rhythm, S1, S2 without murmur, rub or gallop.


ABDOMEN: Soft, nontender, nondistended, normoactive bowel sounds, no guarding, 

no rebound, no hepatosplenomegaly, no masses.


EXTREMITIES: 2+ pulses, warm, well-perfused, no edema. 


NEUROLOGICAL: Non-focal 


SKIN: warm, dry, normal turgor, no rashes or lesions noted


                         Laboratory Results - last 24 hr











  06/30/20 06/30/20





  05:25 05:25


 


WBC  7.0 


 


RBC  3.15 L 


 


Hgb  9.3 L 


 


Hct  27.5 L 


 


MCV  87.4 


 


MCH  29.7 


 


MCHC  33.9 


 


RDW  14.4 


 


Plt Count  158 


 


MPV  8.6 


 


Sodium   143


 


Potassium   3.6


 


Chloride   104


 


Carbon Dioxide   34 H


 


Anion Gap   6 L


 


BUN   10.4


 


Creatinine   0.7


 


Est GFR (CKD-EPI)AfAm   109.27


 


Est GFR (CKD-EPI)NonAf   94.28


 


Random Glucose   104


 


Calcium   8.2 L


 


Phosphorus   3.6


 


Magnesium   2.2











ASSESSMENT/PLAN:


Acute Respiratory Insufficiency 


POD #4: S/P T12 to S2 laminectomies, decompression, and fusion 


HTN 


HPL 


History of PACs & PVCs & SVT S/P ablation 2017





Pain control 


Incentive Spirometry 


VTE prophylaxis 


Normal transfusion thresholds 


PT 


DC planning 





Dr Chaves

## 2020-06-30 NOTE — PN
Progress Note (short form)





- Note


Progress Note: 





NEUROSURGERY





POD #5 





No acute events over past 24hrs per RN notes.


Patient is alert.


In bed RLR as laying supine is a bit uncomfortable.


C/o mild incisional tenderness. Adequate pain control with medications ordered.


Was OOB yesterday with PT (notes reviewed).


Voiding spontaneously.


Received 1 pRBC yesterday.


Denies n/v/f/c, CP, palpitations, parasthesias, numbness/tingling





                                Last Vital Signs











Temp Pulse Resp BP Pulse Ox


 


 98.3 F   84   30 H  164/98   97 


 


 06/30/20 06:00  06/30/20 06:00  06/30/20 06:00  06/30/20 06:00  06/29/20 19:34








                                    CBC, BMP





                                 06/30/20 05:25 





                                 06/30/20 05:25 





                               24Hr Drain Output 











  06/29/20 06/29/20 06/29/20 06/29/20 06/30/20





  07:06 10:00 15:00 23:00 06:45


 


Lumbar KIAH 80 30 45 40 60











PE


Gen: A&O X3


Resp: breathing comfortably


Back: incision clean with no erythema or discharge, drainage serosanguinous


Ext: no weakness or numbness.





A/P: POD #5 s/p  T12-S2 Laminectomies with L34, L45 and L5S1 transpedicular 

decompression and osteotomies and deformity correction T12-S2 pedicle screw 

fusion with athrodesis and interbody cage at L5-S1





- Downgrade to floor


- Cont OOB and mobilize with PT


- Cardio f/u


- Must wear TLSO brace while seated in chair or while ambulating


- Sodium controlled diet


- DVT PPx


- If patient accepted to [facility] and can be arranged for transfer today, 

Surgery PAs will remove KIAH drain prior to dc (until then, cont to record output)


- IV ABX to cont while KIAH drain remains in


Above plan discussed with Dr. Gray and agrees.








Problem List





- Problems


(1) Fusion of spine, thoracolumbar region


Code(s): M43.25 - FUSION OF SPINE, THORACOLUMBAR REGION   





(2) Hepatitis C


Code(s): B19.20 - UNSPECIFIED VIRAL HEPATITIS C WITHOUT HEPATIC COMA   





(3) Anxiety and depression


Code(s): F41.8 - OTHER SPECIFIED ANXIETY DISORDERS   





(4) Hypercholesterolemia


Code(s): E78.00 - PURE HYPERCHOLESTEROLEMIA, UNSPECIFIED   





(5) Hypertension


Code(s): I10 - ESSENTIAL (PRIMARY) HYPERTENSION   





(6) PTSD (post-traumatic stress disorder)


Code(s): F43.10 - POST-TRAUMATIC STRESS DISORDER, UNSPECIFIED

## 2020-07-01 LAB
DEPRECATED RDW RBC AUTO: 14.3 % (ref 11.9–15.9)
HCT VFR BLD CALC: 27.2 % (ref 35.4–49)
HGB BLD-MCNC: 9.2 GM/DL (ref 11.7–16.9)
MCH RBC QN AUTO: 29.8 PG (ref 25.7–33.7)
MCHC RBC AUTO-ENTMCNC: 33.9 G/DL (ref 32–35.9)
MCV RBC: 87.9 FL (ref 80–96)
PLATELET # BLD AUTO: 192 K/MM3 (ref 134–434)
PMV BLD: 8.8 FL (ref 7.5–11.1)
RBC # BLD AUTO: 3.1 M/MM3 (ref 4–5.6)
WBC # BLD AUTO: 7.9 K/MM3 (ref 4–10)

## 2020-07-01 RX ADMIN — ACETAMINOPHEN PRN MG: 325 TABLET ORAL at 16:15

## 2020-07-01 RX ADMIN — PANTOPRAZOLE SODIUM SCH MG: 20 TABLET, DELAYED RELEASE ORAL at 06:00

## 2020-07-01 RX ADMIN — VENLAFAXINE HYDROCHLORIDE SCH MG: 75 CAPSULE, EXTENDED RELEASE ORAL at 21:29

## 2020-07-01 RX ADMIN — OXYCODONE HYDROCHLORIDE AND ACETAMINOPHEN SCH MG: 500 TABLET ORAL at 11:10

## 2020-07-01 RX ADMIN — OXYCODONE HYDROCHLORIDE AND ACETAMINOPHEN SCH MG: 500 TABLET ORAL at 21:31

## 2020-07-01 RX ADMIN — MULTIVITAMIN TABLET SCH TAB: TABLET at 11:11

## 2020-07-01 RX ADMIN — HYDROCHLOROTHIAZIDE SCH MG: 25 TABLET ORAL at 11:10

## 2020-07-01 RX ADMIN — TAMSULOSIN HYDROCHLORIDE SCH MG: 0.4 CAPSULE ORAL at 11:10

## 2020-07-01 RX ADMIN — OXYCODONE HYDROCHLORIDE SCH: 10 TABLET, FILM COATED, EXTENDED RELEASE ORAL at 11:12

## 2020-07-01 RX ADMIN — POLYETHYLENE GLYCOL 3350 SCH GRAMS: 17 POWDER, FOR SOLUTION ORAL at 11:11

## 2020-07-01 RX ADMIN — ENOXAPARIN SODIUM SCH MG: 40 INJECTION SUBCUTANEOUS at 11:11

## 2020-07-01 RX ADMIN — ACETAMINOPHEN PRN MG: 325 TABLET ORAL at 05:01

## 2020-07-01 RX ADMIN — OXYCODONE HYDROCHLORIDE SCH MG: 10 TABLET, FILM COATED, EXTENDED RELEASE ORAL at 21:30

## 2020-07-01 RX ADMIN — VENLAFAXINE HYDROCHLORIDE SCH MG: 75 CAPSULE, EXTENDED RELEASE ORAL at 11:10

## 2020-07-01 NOTE — DS
Physical Examination


Vital Signs: 


                                   Vital Signs











Temperature  98.6 F   07/01/20 10:12


 


Pulse Rate  85   07/01/20 10:12


 


Respiratory Rate  18   07/01/20 10:12


 


Blood Pressure  116/67   07/01/20 10:12


 


O2 Sat by Pulse Oximetry (%)  97   06/30/20 20:36











Constitutional: Yes: No Distress, Calm


Cardiovascular: Yes: Regular Rate and Rhythm


Respiratory: Yes: CTA Bilaterally


Gastrointestinal: Yes: Normal Bowel Sounds, Soft.  No: Tenderness


Edema: No


Labs: 


                                    CBC, BMP





                                 07/01/20 06:30 





                                 06/30/20 05:25 











Discharge Summary


Problems reviewed: Yes


Reason For Visit: LUMBAR SPONDYLOSIS


Current Active Problems





Fusion of spine, thoracolumbar region (Acute)


Hepatitis C (Acute)


History of cholecystectomy (Acute)


History of penile implant (Acute)


S/P laminectomy (Acute)








Hospital Course: 


PATIENT UNDERWENT ON 6/25





Operation: T12-S2 Laminectomies with L34, L45 and L5S1 transpedicular 

decompression and osteotomies and deformity correction T12-S2 pedicle screw 

fusion with athrodesis and interbody cage at L5-S1





POST OP -- HE WAS FEBRILE ON 6/28, 6/29


RECEIVED POST OP ANTIBIOTICS 


URINE AND BLOOD CULTURES AND CXR NEGATIVE


PT IS ABLE TO AMBULATE WITH ASSISTANCE


KIAH DRAIN REMOVED YESTERDAY -- NO SOAKAGE OF DRESSING TODAY 


LABS GOOD


STABLE FOR DC TO STR 


Condition: Guarded





- Instructions


Diet, Activity, Other Instructions: 


Post Operative Instructions





Physical Activity


Resume your normal everyday activity as tolerated. No heavy lifting or exercise 

until seen by your surgeon. You may walk 


unlimited amounts and climb stairs. You may resume driving the car when you feel

safe and comfortable behind the


wheel and you are no longer wearing your brace. Do not operate a vehicle while 

taking narcotic medication.





Brace 


If you had back surgery, wear TLSO Brace whenever out of bed. May remove to 

sleep and shower.








Wound Care


Keep your incision clean, dry and covered at all times. Apply an occlusive 

dressing (Saran wrap or Tegaderm) when showering


to avoid getting your incision wet. Do not submerge incision or apply ointments 

or creams.


The staples will be removed in the office in 10-14 days post-op.








Diet


There are no dietary restrictions. Eat healthy, high-fiber foods. Drink 6-8 

glasses of liquid each day. This will assist


in keeping your bowels regular.





Pain Management


You may take Tylenol or acetaminophen. Any pain prescription medication ordered 

should be taken as prescribed for moderate to


severe pain. Avoid any ibuprofen (Motrin, Advil, Aleve, Toradol, etc) for 3 

months unless otherwise discussed with your surgeon.





Call Dr Kenny for any of the following:


Severe pain not relieved by medication


Fever of 101 or higher


Excessive bleeding or drainage on dressing


Inability to urinate





ISTOP: [id number]





Any chest pain or shortness of breath, seek Emergency Care.





Call the office to confirm a post-operative appointment for 2-3 weeks post-op





Thad Gray MD


[facility]


[address] [place] [address]


[phone]





Referrals: 


Thad Gray MD, FAANS [Staff Physician] - 1 Week


Norma Rodriguez MD [Staff Physician] - 1 Week


Disposition: HOME





- Home Medications


Comprehensive Discharge Medication List: 


Ambulatory Orders





Ascorbic Acid [Vitamin C -] 500 mg PO BID 01/08/18 


Cholecalciferol (Vitamin D3) [Vitamin D3 -] 1,000 unit PO DAILY 01/08/18 


Hydrochlorothiazide [Hctz -] 25 mg PO DAILY 01/08/18 


Atorvastatin Ca [Lipitor] 40 mg PO HS 06/24/20 


Bifidobacterium Infantis [Align] 10.5 mg PO DAILY 06/24/20 


Calcium Carbonate/Vitamin D3 [Calcium 500 + Vit D Caplet] 1 each PO DAILY 

06/24/20 


Clonazepam 1 mg PO BID 06/24/20 


Dicyclomine HCl 10 mg PO BID 06/24/20 


Gabapentin 200 mg PO BID 06/24/20 


Multivitamin [One-Daily Multi-Vitamin] 1 each PO DAILY 06/24/20 


Pantoprazole Sodium [Protonix -] 20 mg PO DAILY 06/24/20 


Tamsulosin HCl 0.4 mg PO DAILY 06/24/20 


Venlafaxine HCl ER [Effexor Xr -] 75 mg PO BID 06/24/20

## 2020-07-01 NOTE — PN
Progress Note (short form)





- Note


Progress Note: 





NEUROSURGERY





Patient last seen by surgical team yesterday.


He was cleared by surgery for discharge as he was accepted to [facility].


Apparently, pt was febrile last night.


Bed currently being held. 


Currently afebrile.











Problem List





- Problems


(1) Fusion of spine, thoracolumbar region


Code(s): M43.25 - FUSION OF SPINE, THORACOLUMBAR REGION   





(2) Hepatitis C


Code(s): B19.20 - UNSPECIFIED VIRAL HEPATITIS C WITHOUT HEPATIC COMA   





(3) Anxiety and depression


Code(s): F41.8 - OTHER SPECIFIED ANXIETY DISORDERS   





(4) PTSD (post-traumatic stress disorder)


Code(s): F43.10 - POST-TRAUMATIC STRESS DISORDER, UNSPECIFIED

## 2020-07-01 NOTE — PN
Progress Note (short form)





- Note


Progress Note: 





Chief Complaint: 





atrial arrhythmia


History of Present Illness: 





denies sob, cp, palp, dizzy


                              Current Medications











Generic Name Dose Route Start Last Admin





  Trade Name Dilipq  PRN Reason Stop Dose Admin


 


Acetaminophen  650 mg  06/29/20 18:58  07/01/20 05:01





  Tylenol -  PO   650 mg





  Q6H PRN   Administration





  FEVER  


 


Ascorbic Acid  500 mg  07/01/20 10:00  07/01/20 11:10





  Vitamin C -  PO   500 mg





  BID ANTELMO   Administration


 


Atorvastatin Calcium  40 mg  07/01/20 22:00 





  Lipitor -  PO  





  HS ANTELMO  


 


Clonazepam  1 mg  07/01/20 00:42  07/01/20 11:10





  Klonopin -  PO   1 mg





  BID PRN   Administration





  ANXIETY  


 


Diphenhydramine HCl  25 mg  07/01/20 00:42 





  Benadryl Injection -  IVPUSH  





  ONCE PRN  





  FOR ITCHING  


 


Enoxaparin Sodium  40 mg  07/01/20 10:00  07/01/20 11:11





  Lovenox -  SQ   40 mg





  DAILY ANTELMO   Administration


 


Hydrochlorothiazide  25 mg  07/01/20 10:00  07/01/20 11:10





  Hctz -  PO   25 mg





  DAILY ANTELMO   Administration


 


Multivitamins/Minerals/Vitamin C  1 tab  07/01/20 10:00  07/01/20 11:11





  Tab-A-Vit -  PO   1 tab





  DAILY ANTELMO   Administration


 


Naloxone HCl  0.4 mg  07/01/20 00:42 





  Narcan -  IVPUSH  





  ONCE PRN  





  Sedation  


 


Oxycodone HCl  10 mg  07/01/20 10:00  07/01/20 11:12





  Oxycontin -  PO   Not Given





  BID ANTELMO  


 


Pantoprazole Sodium  20 mg  07/01/20 07:00  07/01/20 06:00





  Protonix -  PO   20 mg





  ACBK ANTELMO   Administration


 


Polyethylene Glycol  17 gm  07/01/20 10:00  07/01/20 11:11





  Miralax (For Daily Use) -  PO   17 grams





  DAILY ANTELMO   Administration


 


Tamsulosin HCl  0.4 mg  07/01/20 08:30  07/01/20 11:10





  Flomax -  PO   0.4 mg





  0830 ANTELMO   Administration


 


Venlafaxine HCl  75 mg  07/01/20 10:00  07/01/20 11:10





  Effexor Xr -  PO   75 mg





  BID ANTELMO   Administration








                                   Vital Signs











 Period  Temp  Pulse  Resp  BP Sys/Beard  Pulse Ox


 


 Last 24 Hr  98.6 F-100.2 F  80-88  18-20  116-150/51-80  97











Constitutional: Yes: Well Nourished, No Distress, Calm


Cardiovascular: Yes: Regular Rate and Rhythm.  No: JVD, Gallop, Murmur


Respiratory: Yes: Regular, CTA Bilaterally.  No: Accessory Muscle Use


abd: soft, ND, NT + bs


Extremities: No: Cold


Edema: No (SCDs)


Neurological: Yes: Alert.  No: Lethargy, Seizure


Psychiatric: No: Agitated


no jaundice, diaphoresis





Assessment/Plan


tele: SR, brief atrial run





echo 6/2020: nl lv/rv, no sig valve path








a/p:  74 m hx htn, hld, hx pacs and pvcs, svt s/p ablation 2017, s/p elective 

spine surgery.





sinus with frequent APCs, PSVT, nonsustained VT:


-tele benign


-echo here unremarkable





htn:


-cont current meds





hld:


-cont home med





svt s/p ablation:


-has been in sr here, brief run PSVT on tele





s/p laminectomy:


-Post op management as per Critical Care an Neurosurgery


- plan for rehab

## 2020-07-02 VITALS — TEMPERATURE: 97.4 F | DIASTOLIC BLOOD PRESSURE: 60 MMHG | HEART RATE: 99 BPM | SYSTOLIC BLOOD PRESSURE: 108 MMHG

## 2020-07-02 RX ADMIN — OXYCODONE HYDROCHLORIDE SCH MG: 10 TABLET, FILM COATED, EXTENDED RELEASE ORAL at 10:07

## 2020-07-02 RX ADMIN — TAMSULOSIN HYDROCHLORIDE SCH MG: 0.4 CAPSULE ORAL at 08:23

## 2020-07-02 RX ADMIN — POLYETHYLENE GLYCOL 3350 SCH GRAMS: 17 POWDER, FOR SOLUTION ORAL at 10:12

## 2020-07-02 RX ADMIN — OXYCODONE HYDROCHLORIDE AND ACETAMINOPHEN SCH MG: 500 TABLET ORAL at 10:06

## 2020-07-02 RX ADMIN — ACETAMINOPHEN PRN MG: 325 TABLET ORAL at 06:39

## 2020-07-02 RX ADMIN — HYDROCHLOROTHIAZIDE SCH MG: 25 TABLET ORAL at 10:07

## 2020-07-02 RX ADMIN — VENLAFAXINE HYDROCHLORIDE SCH MG: 75 CAPSULE, EXTENDED RELEASE ORAL at 10:06

## 2020-07-02 RX ADMIN — ENOXAPARIN SODIUM SCH MG: 40 INJECTION SUBCUTANEOUS at 10:07

## 2020-07-02 RX ADMIN — PANTOPRAZOLE SODIUM SCH MG: 20 TABLET, DELAYED RELEASE ORAL at 06:40

## 2020-07-02 RX ADMIN — MULTIVITAMIN TABLET SCH TAB: TABLET at 10:06

## 2020-07-02 NOTE — PN
Progress Note (short form)





- Note


Progress Note: 





POD#7 Laminectomy





had low grade fever yesterday


low grade at 4pm and 8 pm 


currently afebrile


no diarrhea, no abd pain


back pain controlled


 no cough or SOB 


                               Vital Signs - 24 hr











  07/01/20 07/01/20 07/01/20





  14:00 16:30 20:26


 


Temperature 98.5 F 99.4 F 99.5 F


 


Pulse Rate 95 H 92 H 87


 


Respiratory 18 18 18





Rate   


 


Blood Pressure 101/56 L 114/52 L 148/79


 


O2 Sat by Pulse   





Oximetry (%)   














  07/01/20 07/02/20 07/02/20





  21:00 06:00 09:00


 


Temperature  98.9 F 


 


Pulse Rate  91 H 


 


Respiratory  18 





Rate   


 


Blood Pressure  124/69 


 


O2 Sat by Pulse 97  96





Oximetry (%)   














  07/02/20





  09:18


 


Temperature 98.2 F


 


Pulse Rate 62


 


Respiratory 20





Rate 


 


Blood Pressure 133/62


 


O2 Sat by Pulse 





Oximetry (%) 








                               Current Medications











Generic Name Dose Route Start Last Admin





  Trade Name Freq  PRN Reason Stop Dose Admin


 


Acetaminophen  650 mg  06/29/20 18:58  07/02/20 06:39





  Tylenol -  PO   650 mg





  Q6H PRN   Administration





  FEVER  


 


Ascorbic Acid  500 mg  07/01/20 10:00  07/02/20 10:06





  Vitamin C -  PO   500 mg





  BID ANTELMO   Administration


 


Atorvastatin Calcium  40 mg  07/01/20 22:00  07/01/20 21:30





  Lipitor -  PO   40 mg





  HS ANTELMO   Administration


 


Clonazepam  1 mg  07/01/20 00:42  07/02/20 10:21





  Klonopin -  PO   1 mg





  BID PRN   Administration





  ANXIETY  


 


Diphenhydramine HCl  25 mg  07/01/20 00:42 





  Benadryl Injection -  IVPUSH  





  ONCE PRN  





  FOR ITCHING  


 


Enoxaparin Sodium  40 mg  07/01/20 10:00  07/02/20 10:07





  Lovenox -  SQ   40 mg





  DAILY ANTELMO   Administration


 


Hydrochlorothiazide  25 mg  07/01/20 10:00  07/02/20 10:07





  Hctz -  PO   25 mg





  DAILY ANTELMO   Administration


 


Multivitamins/Minerals/Vitamin C  1 tab  07/01/20 10:00  07/02/20 10:06





  Tab-A-Vit -  PO   1 tab





  DAILY ANTELMO   Administration


 


Naloxone HCl  0.4 mg  07/01/20 00:42 





  Narcan -  IVPUSH  





  ONCE PRN  





  Sedation  


 


Oxycodone HCl  10 mg  07/01/20 10:00  07/02/20 10:07





  Oxycontin -  PO   10 mg





  BID ANTELMO   Administration


 


Pantoprazole Sodium  20 mg  07/01/20 07:00  07/02/20 06:40





  Protonix -  PO   20 mg





  ACBK ANTELMO   Administration


 


Polyethylene Glycol  17 gm  07/01/20 10:00  07/02/20 10:12





  Miralax (For Daily Use) -  PO   17 grams





  DAILY ANTELMO   Administration


 


Tamsulosin HCl  0.4 mg  07/01/20 08:30  07/02/20 08:23





  Flomax -  PO   0.4 mg





  0830 ANTELMO   Administration


 


Venlafaxine HCl  75 mg  07/01/20 10:00  07/02/20 10:06





  Effexor Xr -  PO   75 mg





  BID ANTELMO   Administration

















S1 S2 RRR


Lungs decreased


Abd- soft, obese, NT


no edema


no soakage of dressing 





A/P


Problem List





- Problems


(1) S/P laminectomy


Assessment/Plan: 





pain control- may give tylenol -- try to use Oxycodone sparingly


Pt eval


Back brace 








Problems reviewed: Yes   


Code(s): Z98.890 - OTHER SPECIFIED POSTPROCEDURAL STATES   





(2) Hepatitis C


Assessment/Plan: 


s/p treatment


Problems reviewed: Yes   


Code(s): B19.20 - UNSPECIFIED VIRAL HEPATITIS C WITHOUT HEPATIC COMA   





(3) History of cholecystectomy


Assessment/Plan: 


no active issues


Problems reviewed: Yes   


Code(s): Z90.49 - ACQUIRED ABSENCE OF OTHER SPECIFIED PARTS OF DIGESTIVE TRACT  







(4) History of penile implant


Assessment/Plan: 


No active issues


Problems reviewed: Yes   


Code(s): Z96.0 - PRESENCE OF UROGENITAL IMPLANTS   





(5) History of cardiac radiofrequency ablation


Assessment/Plan: 


cardiology following


Code(s): Z98.890 - OTHER SPECIFIED POSTPROCEDURAL STATES   





(6) Hypertension


Assessment/Plan: 


on meds


Problems reviewed: Yes   


Code(s): I10 - ESSENTIAL (PRIMARY) HYPERTENSION   





(7) Generalized anxiety disorder


Problems reviewed: Yes   


Code(s): F41.1 - GENERALIZED ANXIETY DISORDER   





(8) PTSD (post-traumatic stress disorder)


Problems reviewed: Yes   


Code(s): F43.10 - POST-TRAUMATIC STRESS DISORDER, UNSPECIFIED   








Anemia-->monitor , no active bleeding 


hypokalemia-- replace lytes, replace Phosphorus


low grade Fever-- currently afebrile


pt received tylenol for pain at 5pm and 10 pm yesterday and today at around 6 am

--> he is getting Tylenol instead of narcotics as it makes him confused





cultures are negative


normal WBC


CXR normal


on DVT prophylaxis





patient is stable for discharge to rehab 





Problem List





- Problems


(1) Hepatitis C


Code(s): B19.20 - UNSPECIFIED VIRAL HEPATITIS C WITHOUT HEPATIC COMA   





(2) S/P laminectomy


Code(s): Z98.890 - OTHER SPECIFIED POSTPROCEDURAL STATES   





(3) History of back surgery


Code(s): Z98.890 - OTHER SPECIFIED POSTPROCEDURAL STATES   





(4) Hypercholesterolemia


Code(s): E78.00 - PURE HYPERCHOLESTEROLEMIA, UNSPECIFIED   





(5) Hypertension


Code(s): I10 - ESSENTIAL (PRIMARY) HYPERTENSION   





(6) PTSD (post-traumatic stress disorder)


Code(s): F43.10 - POST-TRAUMATIC STRESS DISORDER, UNSPECIFIED

## 2020-07-02 NOTE — PN
Progress Note (short form)





- Note


Progress Note: 





History of Present Illness: 





denies sob, cp, palp, dizzy


                                        


                               Current Medications











Generic Name Dose Route Start Last Admin





  Trade Name Freq  PRN Reason Stop Dose Admin


 


Acetaminophen  650 mg  06/29/20 18:58  07/02/20 06:39





  Tylenol -  PO   650 mg





  Q6H PRN   Administration





  FEVER  


 


Ascorbic Acid  500 mg  07/01/20 10:00  07/02/20 10:06





  Vitamin C -  PO   500 mg





  BID ANTELMO   Administration


 


Atorvastatin Calcium  40 mg  07/01/20 22:00  07/01/20 21:30





  Lipitor -  PO   40 mg





  HS ANTELMO   Administration


 


Clonazepam  1 mg  07/01/20 00:42  07/02/20 10:21





  Klonopin -  PO   1 mg





  BID PRN   Administration





  ANXIETY  


 


Diphenhydramine HCl  25 mg  07/01/20 00:42 





  Benadryl Injection -  IVPUSH  





  ONCE PRN  





  FOR ITCHING  


 


Enoxaparin Sodium  40 mg  07/01/20 10:00  07/02/20 10:07





  Lovenox -  SQ   40 mg





  DAILY ANTELMO   Administration


 


Hydrochlorothiazide  25 mg  07/01/20 10:00  07/02/20 10:07





  Hctz -  PO   25 mg





  DAILY ANTELMO   Administration


 


Multivitamins/Minerals/Vitamin C  1 tab  07/01/20 10:00  07/02/20 10:06





  Tab-A-Vit -  PO   1 tab





  DAILY ANTELMO   Administration


 


Naloxone HCl  0.4 mg  07/01/20 00:42 





  Narcan -  IVPUSH  





  ONCE PRN  





  Sedation  


 


Oxycodone HCl  10 mg  07/01/20 10:00  07/02/20 10:07





  Oxycontin -  PO   10 mg





  BID ANTELMO   Administration


 


Pantoprazole Sodium  20 mg  07/01/20 07:00  07/02/20 06:40





  Protonix -  PO   20 mg





  ACBK ANTELMO   Administration


 


Polyethylene Glycol  17 gm  07/01/20 10:00  07/02/20 10:12





  Miralax (For Daily Use) -  PO   17 grams





  DAILY ANTELMO   Administration


 


Tamsulosin HCl  0.4 mg  07/01/20 08:30  07/02/20 08:23





  Flomax -  PO   0.4 mg





  0830 ANTELMO   Administration


 


Venlafaxine HCl  75 mg  07/01/20 10:00  07/02/20 10:06





  Effexor Xr -  PO   75 mg





  BID ANTELMO   Administration








                                   Vital Signs











 Period  Temp  Pulse  Resp  BP Sys/Beard  Pulse Ox


 


 Last 24 Hr  98.2 F-99.5 F  62-95  18-20  101-148/52-79  96-97











Constitutional: Yes: Well Nourished, No Distress, Calm


Cardiovascular: Yes: Regular Rate and Rhythm.  No: JVD, Gallop, Murmur


Respiratory: Yes: Regular, CTA Bilaterally.  No: Accessory Muscle Use


abd: soft, ND, NT + bs


Extremities: No: Cold


Edema: No (SCDs)


Neurological: Yes: Alert.  No: Lethargy, Seizure


Psychiatric: No: Agitated


no jaundice, diaphoresis





                                    CBC, BMP





                                 07/01/20 06:30 





                                 06/30/20 05:25 








Assessment/Plan


tele: SR, brief atrial run





echo 6/2020: nl lv/rv, no sig valve path








a/p:  74 m hx htn, hld, hx pacs and pvcs, svt s/p ablation 2017, s/p elective 

spine surgery.





sinus with frequent APCs, PSVT, nonsustained VT:


-tele benign


-echo here unremarkable





htn:


-cont current meds





hld:


-cont home med





svt s/p ablation:


-has been in sr here, brief run PSVT on tele





s/p laminectomy:


-Post op management as per Critical Care an Neurosurgery


- plan for rehab

## 2020-07-13 NOTE — SURG
Surgery First Assist Note


First Assist: Lorena Lopez PA-C


Date of Service: 06/25/20


Diagnosis: 





Lumbar degenerative scoliosis 


Procedure: 





1) T12 Laminectomies


2) Bilateral reoperative L1 Laminectomies


3) Bilateral reoperative L2 Laminectomies 


4) Bilateral reoperative L3 Laminectomies 


5) Bilateral reoperative L4 Laminectomies


6) Bilateral reoperative L5 Laminectomies 


7) Bilateral reoperative S1 Laminectomies


8) L5S1 transpedicular approach


9) Fluroscopy


10) Microdissection


11) Posterior/Lateral arthrodesis T12L1


12) Posterior/Lateral arthrodesis  L12


13) Posterior/Lateral arthrodesis L23


14) Posterior/Lateral arthrodesis L34


15) Posterior/Lateral arthrodesis L45


16) Interbody and posterior/Lateral arthrodesis L5S1


17) Interbody  cage L5S1


18) Spinopelvic arthrodesis


19) T12-S2 posterior segmental instrumentation (technically challenging)


20) Local autograft


21) T12 Osteotomy


22) L1 Osteotomy


23) L5 osteotomy


24) S1 osteotomy


25) deformity correction


26) bilateral soft tissue advancement flaps (50cm^2)


27) Removal of spinal cord stimulator (electrode- technically challenging) 


28) Removal of spinal cord stimulator pulse generator





I was present for the entirety of the operative procedure. For further detail, 

please refer to operative report.








Visit type





- Case Type


Case Type: Scheduled





- Emergency


Emergency Visit: Yes


ED Registration Date: 06/25/20


Care time: The patient presented to the Emergency Department on the above date 

and was hospitalized for further evaluation of their emergent condition.





- New patient


This patient is new to me today: Yes


Date on this admission: 06/25/20

## 2022-07-19 ENCOUNTER — HOSPITAL ENCOUNTER (OUTPATIENT)
Dept: HOSPITAL 74 - JASU-SURG | Age: 77
Discharge: HOME | End: 2022-07-19
Attending: STUDENT IN AN ORGANIZED HEALTH CARE EDUCATION/TRAINING PROGRAM
Payer: COMMERCIAL

## 2022-07-19 VITALS — TEMPERATURE: 97.1 F

## 2022-07-19 VITALS — BODY MASS INDEX: 28.2 KG/M2

## 2022-07-19 VITALS — SYSTOLIC BLOOD PRESSURE: 154 MMHG | HEART RATE: 83 BPM | DIASTOLIC BLOOD PRESSURE: 66 MMHG

## 2022-07-19 DIAGNOSIS — M53.3: Primary | ICD-10-CM

## 2022-07-19 PROCEDURE — 3E0U33Z INTRODUCTION OF ANTI-INFLAMMATORY INTO JOINTS, PERCUTANEOUS APPROACH: ICD-10-PCS | Performed by: STUDENT IN AN ORGANIZED HEALTH CARE EDUCATION/TRAINING PROGRAM

## 2022-07-19 PROCEDURE — 3E0U3BZ INTRODUCTION OF ANESTHETIC AGENT INTO JOINTS, PERCUTANEOUS APPROACH: ICD-10-PCS | Performed by: STUDENT IN AN ORGANIZED HEALTH CARE EDUCATION/TRAINING PROGRAM

## 2022-08-30 ENCOUNTER — HOSPITAL ENCOUNTER (OUTPATIENT)
Dept: HOSPITAL 74 - JASU-SURG | Age: 77
Discharge: HOME | End: 2022-08-30
Attending: STUDENT IN AN ORGANIZED HEALTH CARE EDUCATION/TRAINING PROGRAM
Payer: COMMERCIAL

## 2022-08-30 VITALS — BODY MASS INDEX: 27.9 KG/M2

## 2022-08-30 VITALS — DIASTOLIC BLOOD PRESSURE: 70 MMHG | TEMPERATURE: 98 F | HEART RATE: 80 BPM | SYSTOLIC BLOOD PRESSURE: 130 MMHG

## 2022-08-30 VITALS — RESPIRATION RATE: 20 BRPM

## 2022-08-30 DIAGNOSIS — G89.4: Primary | ICD-10-CM

## 2022-08-30 PROCEDURE — 01HY3MZ INSERTION OF NEUROSTIMULATOR LEAD INTO PERIPHERAL NERVE, PERCUTANEOUS APPROACH: ICD-10-PCS | Performed by: STUDENT IN AN ORGANIZED HEALTH CARE EDUCATION/TRAINING PROGRAM

## 2022-08-30 PROCEDURE — 64555 IMPLANT NEUROELECTRODES: CPT

## 2022-08-30 PROCEDURE — C1778 LEAD, NEUROSTIMULATOR: HCPCS

## 2022-12-23 ENCOUNTER — HOSPITAL ENCOUNTER (OUTPATIENT)
Dept: HOSPITAL 74 - JASU-SURG | Age: 77
Discharge: HOME | End: 2022-12-23
Attending: STUDENT IN AN ORGANIZED HEALTH CARE EDUCATION/TRAINING PROGRAM
Payer: COMMERCIAL

## 2022-12-23 VITALS — HEART RATE: 72 BPM | TEMPERATURE: 98 F | SYSTOLIC BLOOD PRESSURE: 158 MMHG | DIASTOLIC BLOOD PRESSURE: 75 MMHG

## 2022-12-23 VITALS — RESPIRATION RATE: 20 BRPM

## 2022-12-23 VITALS — BODY MASS INDEX: 27.9 KG/M2

## 2022-12-23 DIAGNOSIS — G89.4: Primary | ICD-10-CM

## 2022-12-23 PROCEDURE — 01HY3MZ INSERTION OF NEUROSTIMULATOR LEAD INTO PERIPHERAL NERVE, PERCUTANEOUS APPROACH: ICD-10-PCS | Performed by: STUDENT IN AN ORGANIZED HEALTH CARE EDUCATION/TRAINING PROGRAM

## 2022-12-23 PROCEDURE — 64555 IMPLANT NEUROELECTRODES: CPT

## 2023-01-24 ENCOUNTER — HOSPITAL ENCOUNTER (OUTPATIENT)
Dept: HOSPITAL 74 - JASU-SURG | Age: 78
Discharge: HOME | End: 2023-01-24
Attending: STUDENT IN AN ORGANIZED HEALTH CARE EDUCATION/TRAINING PROGRAM
Payer: COMMERCIAL

## 2023-01-24 VITALS
DIASTOLIC BLOOD PRESSURE: 56 MMHG | TEMPERATURE: 97.3 F | RESPIRATION RATE: 18 BRPM | SYSTOLIC BLOOD PRESSURE: 161 MMHG | HEART RATE: 63 BPM

## 2023-01-24 VITALS — BODY MASS INDEX: 27.9 KG/M2

## 2023-01-24 DIAGNOSIS — M96.1: ICD-10-CM

## 2023-01-24 DIAGNOSIS — G89.4: Primary | ICD-10-CM

## 2023-01-24 PROCEDURE — 0JH70BZ INSERTION OF SINGLE ARRAY STIMULATOR GENERATOR INTO BACK SUBCUTANEOUS TISSUE AND FASCIA, OPEN APPROACH: ICD-10-PCS | Performed by: STUDENT IN AN ORGANIZED HEALTH CARE EDUCATION/TRAINING PROGRAM

## 2023-01-24 PROCEDURE — 64590 INS/RPL PRPH SAC/GSTR NPG/R: CPT

## 2023-01-24 PROCEDURE — 01HY3MZ INSERTION OF NEUROSTIMULATOR LEAD INTO PERIPHERAL NERVE, PERCUTANEOUS APPROACH: ICD-10-PCS | Performed by: STUDENT IN AN ORGANIZED HEALTH CARE EDUCATION/TRAINING PROGRAM

## 2023-01-24 PROCEDURE — 64555 IMPLANT NEUROELECTRODES: CPT

## 2023-04-18 ENCOUNTER — HOSPITAL ENCOUNTER (OUTPATIENT)
Dept: HOSPITAL 74 - JASU-SURG | Age: 78
Discharge: HOME | End: 2023-04-18
Attending: STUDENT IN AN ORGANIZED HEALTH CARE EDUCATION/TRAINING PROGRAM
Payer: COMMERCIAL

## 2023-04-18 VITALS — BODY MASS INDEX: 29 KG/M2

## 2023-04-18 VITALS — DIASTOLIC BLOOD PRESSURE: 87 MMHG | TEMPERATURE: 97.2 F | SYSTOLIC BLOOD PRESSURE: 167 MMHG | HEART RATE: 84 BPM

## 2023-04-18 VITALS — RESPIRATION RATE: 18 BRPM

## 2023-04-18 DIAGNOSIS — M96.1: ICD-10-CM

## 2023-04-18 DIAGNOSIS — T85.121A: Primary | ICD-10-CM
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
## 2023-04-18 DIAGNOSIS — G89.4: ICD-10-CM

## 2023-04-18 PROCEDURE — 0JWT0MZ REVISION OF STIMULATOR GENERATOR IN TRUNK SUBCUTANEOUS TISSUE AND FASCIA, OPEN APPROACH: ICD-10-PCS | Performed by: STUDENT IN AN ORGANIZED HEALTH CARE EDUCATION/TRAINING PROGRAM

## 2023-04-18 PROCEDURE — 64590 INS/RPL PRPH SAC/GSTR NPG/R: CPT
